# Patient Record
Sex: MALE | Race: WHITE | HISPANIC OR LATINO | URBAN - METROPOLITAN AREA
[De-identification: names, ages, dates, MRNs, and addresses within clinical notes are randomized per-mention and may not be internally consistent; named-entity substitution may affect disease eponyms.]

---

## 2018-03-22 ENCOUNTER — OFFICE VISIT (OUTPATIENT)
Dept: FAMILY MEDICINE CLINIC | Facility: CLINIC | Age: 48
End: 2018-03-22

## 2018-03-22 VITALS
TEMPERATURE: 97.1 F | SYSTOLIC BLOOD PRESSURE: 120 MMHG | HEART RATE: 94 BPM | WEIGHT: 180 LBS | DIASTOLIC BLOOD PRESSURE: 82 MMHG | RESPIRATION RATE: 16 BRPM

## 2018-03-22 DIAGNOSIS — N52.8 OTHER MALE ERECTILE DYSFUNCTION: ICD-10-CM

## 2018-03-22 DIAGNOSIS — Z13.6 SCREENING FOR ISCHEMIC HEART DISEASE: ICD-10-CM

## 2018-03-22 DIAGNOSIS — K21.00 GASTROESOPHAGEAL REFLUX DISEASE WITH ESOPHAGITIS: Primary | ICD-10-CM

## 2018-03-22 DIAGNOSIS — D18.00 CONGENITAL HEMANGIOMA: ICD-10-CM

## 2018-03-22 PROCEDURE — 99204 OFFICE O/P NEW MOD 45 MIN: CPT | Performed by: FAMILY MEDICINE

## 2018-03-22 RX ORDER — SILDENAFIL 100 MG/1
TABLET, FILM COATED ORAL
Qty: 6 TABLET | Refills: 11 | Status: SHIPPED | OUTPATIENT
Start: 2018-03-22 | End: 2019-03-22

## 2018-03-22 RX ORDER — OMEPRAZOLE 20 MG/1
20 CAPSULE, DELAYED RELEASE ORAL DAILY
COMMUNITY
End: 2018-03-22 | Stop reason: ALTCHOICE

## 2018-03-22 RX ORDER — PANTOPRAZOLE SODIUM 40 MG/1
40 TABLET, DELAYED RELEASE ORAL NIGHTLY
Qty: 90 TABLET | Refills: 1 | Status: SHIPPED | OUTPATIENT
Start: 2018-03-22 | End: 2018-04-26 | Stop reason: SDUPTHER

## 2018-03-22 NOTE — PROGRESS NOTES
Chief Complaint   Patient presents with   • Heartburn   • GI Problem       Subjective   This patient is here to establish.  His previous doctor went to [facility].  Today his chief complaint is worsening GERD symptoms, present for about 18 months.  He has been taking over-the-counter proton pump inhibitor and it has lost its effectiveness.  He does have some globus sensation in the neck.  He does have family history of a brother who had a have his esophagus dilated.  Currently the patient does not have swallowing difficulties.    He has had a 6 month episode of ED symptoms with intact libido.  He has noticed decreased morning erection ability.  I have reviewed and updated his medications, medical history and problem list during today's office visit.       Assessment/Plan     Problem List Items Addressed This Visit        Digestive    Gastroesophageal reflux disease with esophagitis - Primary    Relevant Medications    pantoprazole (PROTONIX) 40 MG EC tablet    Other Relevant Orders    Comprehensive metabolic panel    Lipid Panel With LDL/HDL Ratio    CBC and Differential    Helicobacter Pylori, IgA IgG IgM       Other    Other male erectile dysfunction    Relevant Medications    sildenafil (VIAGRA) 100 MG tablet    Congenital hemangioma, right face    Relevant Orders    Ambulatory Referral to Dermatology      Other Visit Diagnoses     Screening for ischemic heart disease        Relevant Orders    Comprehensive metabolic panel    Lipid Panel With LDL/HDL Ratio    CBC and Differential          Orders Placed This Encounter   Procedures   • Comprehensive metabolic panel   • Lipid Panel With LDL/HDL Ratio   • Helicobacter Pylori, IgA IgG IgM   • Ambulatory Referral to Dermatology     Referral Priority:   Routine     Referral Type:   Consultation     Referral Reason:   Specialty Services Required     Referred to Provider:   Michael Moore MD     Requested Specialty:   Dermatology     Number of Visits  Requested:   1   • CBC and Differential     Order Specific Question:   Manual Differential     Answer:   No       F/U in one month         Review of Systems   Gastrointestinal:        See HPI   Genitourinary:        See HPI   All other systems reviewed and are negative.    Objective   /82   Pulse 94   Temp 97.1 °F (36.2 °C) (Oral)   Resp 16   Wt 81.6 kg (180 lb)   There is no height or weight on file to calculate BMI.  Physical Exam   Constitutional: He is oriented to person, place, and time. Vital signs are normal. He appears well-developed. No distress.   HENT:   Head: Normocephalic and atraumatic.   Right Ear: Hearing and tympanic membrane normal.   Left Ear: Hearing and tympanic membrane normal.   Nose: Nose normal.   Mouth/Throat: Uvula is midline, oropharynx is clear and moist and mucous membranes are normal.   Eyes: Conjunctivae, EOM and lids are normal. Pupils are equal, round, and reactive to light.   Neck: Trachea normal and phonation normal. No JVD present. Carotid bruit is not present. No thyroid mass and no thyromegaly present.   Cardiovascular: Normal rate, regular rhythm and normal heart sounds.    Pulmonary/Chest: Effort normal and breath sounds normal.   Abdominal: Soft. Normal appearance and bowel sounds are normal. There is no hepatosplenomegaly. There is no tenderness.   Musculoskeletal: Normal range of motion.        Lumbar back: He exhibits no deformity.   Lymphadenopathy:     He has no cervical adenopathy.        Right: No supraclavicular adenopathy present.        Left: No supraclavicular adenopathy present.   Neurological: He is alert and oriented to person, place, and time. He has normal strength. No cranial nerve deficit.   Reflex Scores:       Patellar reflexes are 2+ on the right side and 2+ on the left side.  Skin: Skin is warm and dry. No rash noted.   Congenital hemangioma right face   Psychiatric: He has a normal mood and affect. His speech is normal and behavior is normal.  Judgment and thought content normal. Cognition and memory are normal. He is attentive.        Social History   Substance Use Topics   • Smoking status: Never Smoker   • Smokeless tobacco: Never Used   • Alcohol use No       Data Reviewed:

## 2018-03-26 LAB
ALBUMIN SERPL-MCNC: 4.8 G/DL (ref 3.5–5.2)
ALBUMIN/GLOB SERPL: 2 G/DL
ALP SERPL-CCNC: 95 U/L (ref 39–117)
ALT SERPL-CCNC: 32 U/L (ref 1–41)
AST SERPL-CCNC: 18 U/L (ref 1–40)
BASOPHILS # BLD AUTO: 0.03 10*3/MM3 (ref 0–0.2)
BASOPHILS NFR BLD AUTO: 0.5 % (ref 0–1.5)
BILIRUB SERPL-MCNC: 0.3 MG/DL (ref 0.1–1.2)
BUN SERPL-MCNC: 17 MG/DL (ref 6–20)
BUN/CREAT SERPL: 14.3 (ref 7–25)
CALCIUM SERPL-MCNC: 10.1 MG/DL (ref 8.6–10.5)
CHLORIDE SERPL-SCNC: 99 MMOL/L (ref 98–107)
CHOLEST SERPL-MCNC: 241 MG/DL (ref 0–200)
CO2 SERPL-SCNC: 25 MMOL/L (ref 22–29)
CREAT SERPL-MCNC: 1.19 MG/DL (ref 0.76–1.27)
EOSINOPHIL # BLD AUTO: 0.17 10*3/MM3 (ref 0–0.7)
EOSINOPHIL NFR BLD AUTO: 2.9 % (ref 0.3–6.2)
ERYTHROCYTE [DISTWIDTH] IN BLOOD BY AUTOMATED COUNT: 13.6 % (ref 11.5–14.5)
GFR SERPLBLD CREATININE-BSD FMLA CKD-EPI: 65 ML/MIN/1.73
GFR SERPLBLD CREATININE-BSD FMLA CKD-EPI: 79 ML/MIN/1.73
GLOBULIN SER CALC-MCNC: 2.4 GM/DL
GLUCOSE SERPL-MCNC: 126 MG/DL (ref 65–99)
H PYLORI IGA SER-ACNC: <9 UNITS (ref 0–8.9)
H PYLORI IGG SER IA-ACNC: 0.28 INDEX VALUE (ref 0–0.79)
H PYLORI IGM SER-ACNC: <9 UNITS (ref 0–8.9)
HCT VFR BLD AUTO: 43.3 % (ref 40.4–52.2)
HDLC SERPL-MCNC: 56 MG/DL (ref 40–60)
HGB BLD-MCNC: 14.1 G/DL (ref 13.7–17.6)
IMM GRANULOCYTES # BLD: 0.02 10*3/MM3 (ref 0–0.03)
IMM GRANULOCYTES NFR BLD: 0.3 % (ref 0–0.5)
LDLC SERPL CALC-MCNC: 159 MG/DL (ref 0–100)
LDLC/HDLC SERPL: 2.85 {RATIO}
LYMPHOCYTES # BLD AUTO: 1.39 10*3/MM3 (ref 0.9–4.8)
LYMPHOCYTES NFR BLD AUTO: 23.3 % (ref 19.6–45.3)
MCH RBC QN AUTO: 29.4 PG (ref 27–32.7)
MCHC RBC AUTO-ENTMCNC: 32.6 G/DL (ref 32.6–36.4)
MCV RBC AUTO: 90.4 FL (ref 79.8–96.2)
MONOCYTES # BLD AUTO: 0.32 10*3/MM3 (ref 0.2–1.2)
MONOCYTES NFR BLD AUTO: 5.4 % (ref 5–12)
NEUTROPHILS # BLD AUTO: 4.03 10*3/MM3 (ref 1.9–8.1)
NEUTROPHILS NFR BLD AUTO: 67.6 % (ref 42.7–76)
PLATELET # BLD AUTO: 156 10*3/MM3 (ref 140–500)
POTASSIUM SERPL-SCNC: 4.3 MMOL/L (ref 3.5–5.2)
PROT SERPL-MCNC: 7.2 G/DL (ref 6–8.5)
RBC # BLD AUTO: 4.79 10*6/MM3 (ref 4.6–6)
SODIUM SERPL-SCNC: 140 MMOL/L (ref 136–145)
TRIGL SERPL-MCNC: 128 MG/DL (ref 0–150)
VLDLC SERPL CALC-MCNC: 25.6 MG/DL (ref 5–40)
WBC # BLD AUTO: 5.96 10*3/MM3 (ref 4.5–10.7)

## 2018-04-26 ENCOUNTER — OFFICE VISIT (OUTPATIENT)
Dept: FAMILY MEDICINE CLINIC | Facility: CLINIC | Age: 48
End: 2018-04-26

## 2018-04-26 VITALS
WEIGHT: 186 LBS | RESPIRATION RATE: 16 BRPM | SYSTOLIC BLOOD PRESSURE: 130 MMHG | DIASTOLIC BLOOD PRESSURE: 85 MMHG | TEMPERATURE: 97.3 F | HEART RATE: 81 BPM

## 2018-04-26 DIAGNOSIS — R73.01 ABNORMAL FASTING GLUCOSE: ICD-10-CM

## 2018-04-26 DIAGNOSIS — Z23 IMMUNIZATION DUE: ICD-10-CM

## 2018-04-26 DIAGNOSIS — E78.00 PURE HYPERCHOLESTEROLEMIA: ICD-10-CM

## 2018-04-26 DIAGNOSIS — K21.00 GASTROESOPHAGEAL REFLUX DISEASE WITH ESOPHAGITIS: Primary | ICD-10-CM

## 2018-04-26 PROCEDURE — 90471 IMMUNIZATION ADMIN: CPT | Performed by: FAMILY MEDICINE

## 2018-04-26 PROCEDURE — 90715 TDAP VACCINE 7 YRS/> IM: CPT | Performed by: FAMILY MEDICINE

## 2018-04-26 PROCEDURE — 99214 OFFICE O/P EST MOD 30 MIN: CPT | Performed by: FAMILY MEDICINE

## 2018-04-26 RX ORDER — PANTOPRAZOLE SODIUM 40 MG/1
40 TABLET, DELAYED RELEASE ORAL NIGHTLY
Qty: 90 TABLET | Refills: 1 | Status: SHIPPED | OUTPATIENT
Start: 2018-04-26 | End: 2021-09-02

## 2018-04-26 NOTE — ASSESSMENT & PLAN NOTE
Lipid abnormalities are newly identified.  Nutritional counseling was provided.  Lipids will be reassessed in 3 months.

## 2018-04-26 NOTE — PATIENT INSTRUCTIONS
Pantoprazole tablets  What is this medicine?  PANTOPRAZOLE (pan TOE pra zole) prevents the production of acid in the stomach. It is used to treat gastroesophageal reflux disease (GERD), inflammation of the esophagus, and Zollinger-Reece syndrome.  This medicine may be used for other purposes; ask your health care provider or pharmacist if you have questions.  COMMON BRAND NAME(S): Protonix  What should I tell my health care provider before I take this medicine?  They need to know if you have any of these conditions:  -liver disease  -low levels of magnesium in the blood  -lupus  -an unusual or allergic reaction to omeprazole, lansoprazole, pantoprazole, rabeprazole, other medicines, foods, dyes, or preservatives  -pregnant or trying to get pregnant  -breast-feeding  How should I use this medicine?  Take this medicine by mouth. Swallow the tablets whole with a drink of water. Follow the directions on the prescription label. Do not crush, break, or chew. Take your medicine at regular intervals. Do not take your medicine more often than directed.  Talk to your pediatrician regarding the use of this medicine in children. While this drug may be prescribed for children as young as 5 years for selected conditions, precautions do apply.  Overdosage: If you think you have taken too much of this medicine contact a poison control center or emergency room at once.  NOTE: This medicine is only for you. Do not share this medicine with others.  What if I miss a dose?  If you miss a dose, take it as soon as you can. If it is almost time for your next dose, take only that dose. Do not take double or extra doses.  What may interact with this medicine?  Do not take this medicine with any of the following medications:  -atazanavir  -nelfinavir  This medicine may also interact with the following medications:  -ampicillin  -delavirdine  -erlotinib  -iron salts  -medicines for fungal infections like ketoconazole, itraconazole and  voriconazole  -methotrexate  -mycophenolate mofetil  -warfarin  This list may not describe all possible interactions. Give your health care provider a list of all the medicines, herbs, non-prescription drugs, or dietary supplements you use. Also tell them if you smoke, drink alcohol, or use illegal drugs. Some items may interact with your medicine.  What should I watch for while using this medicine?  It can take several days before your stomach pain gets better. Check with your doctor or health care professional if your condition does not start to get better, or if it gets worse.  You may need blood work done while you are taking this medicine.  What side effects may I notice from receiving this medicine?  Side effects that you should report to your doctor or health care professional as soon as possible:  -allergic reactions like skin rash, itching or hives, swelling of the face, lips, or tongue  -bone, muscle or joint pain  -breathing problems  -chest pain or chest tightness  -dark yellow or brown urine  -dizziness  -fast, irregular heartbeat  -feeling faint or lightheaded  -fever or sore throat  -muscle spasm  -palpitations  -rash on cheeks or arms that gets worse in the sun  -redness, blistering, peeling or loosening of the skin, including inside the mouth  -seizures  -tremors  -unusual bleeding or bruising  -unusually weak or tired  -yellowing of the eyes or skin  Side effects that usually do not require medical attention (report to your doctor or health care professional if they continue or are bothersome):  -constipation  -diarrhea  -dry mouth  -headache  -nausea  This list may not describe all possible side effects. Call your doctor for medical advice about side effects. You may report side effects to FDA at 4-602-FDA-6959.  Where should I keep my medicine?  Keep out of the reach of children.  Store at room temperature between 15 and 30 degrees C (59 and 86 degrees F). Protect from light and moisture. Throw  "away any unused medicine after the expiration date.  NOTE: This sheet is a summary. It may not cover all possible information. If you have questions about this medicine, talk to your doctor, pharmacist, or health care provider.  © 2018 Elsevier/Gold Standard (2017-01-19 12:20:19)    Fat and Cholesterol Restricted Diet  Getting too much fat and cholesterol in your diet may cause health problems. Following this diet helps keep your fat and cholesterol at normal levels. This can keep you from getting sick.  WHAT TYPES OF FAT SHOULD I CHOOSE?  · Choose monosaturated and polyunsaturated fats. These are found in foods such as olive oil, canola oil, flaxseeds, walnuts, almonds, and seeds.  · Eat more omega-3 fats. Good choices include salmon, mackerel, sardines, tuna, flaxseed oil, and ground flaxseeds.  · Limit saturated fats. These are in animal products such as meats, butter, and cream. They can also be in plant products such as palm oil, palm kernel oil, and coconut oil.  ·   ·   · Avoid foods with partially hydrogenated oils in them. These contain trans fats. Examples of foods that have trans fats are stick margarine, some tub margarines, cookies, crackers, and other baked goods.  WHAT GENERAL GUIDELINES DO I NEED TO FOLLOW?    · Check food labels. Look for the words \"trans fat\" and \"saturated fat.\"  · When preparing a meal:  ¨ Fill half of your plate with vegetables and green salads.  ¨ Fill one fourth of your plate with whole grains. Look for the word \"whole\" as the first word in the ingredient list.  ¨ Fill one fourth of your plate with lean protein foods.  · Limit fruit to two servings a day. Choose fruit instead of juice.  · Eat more foods with soluble fiber. Examples of foods with this type of fiber are apples, broccoli, carrots, beans, peas, and barley. Try to get 20-30 g (grams) of fiber per day.  · Eat more home-cooked foods. Eat less at restaurants and buffets.  · Limit or avoid alcohol.  · Limit foods high " in starch and sugar.  · Limit fried foods.  · Cook foods without frying them. Baking, boiling, grilling, and broiling are all great options.  · Lose weight if you are overweight. Losing even a small amount of weight can help your overall health. It can also help prevent diseases such as diabetes and heart disease.  WHAT FOODS CAN I EAT?  Grains  Whole grains, such as whole wheat or whole grain breads, crackers, cereals, and pasta. Unsweetened oatmeal, bulgur, barley, quinoa, or brown rice. Corn or whole wheat flour tortillas.  Vegetables  Fresh or frozen vegetables (raw, steamed, roasted, or grilled). Green salads.  Fruits  All fresh, canned (in natural juice), or frozen fruits.  Meat and Other Protein Products  Ground beef (85% or leaner), grass-fed beef, or beef trimmed of fat. Skinless chicken or turkey. Ground chicken or turkey. Pork trimmed of fat. All fish and seafood. Eggs. Dried beans, peas, or lentils. Unsalted nuts or seeds. Unsalted canned or dry beans.  Dairy  Low-fat dairy products, such as skim or 1% milk, 2% or reduced-fat cheeses, low-fat ricotta or cottage cheese, or plain low-fat yogurt.  Fats and Oils  Tub margarines without trans fats. Light or reduced-fat mayonnaise and salad dressings. Avocado. Olive, canola, sesame, or safflower oils. Natural peanut or almond butter (choose ones without added sugar and oil).  The items listed above may not be a complete list of recommended foods or beverages. Contact your dietitian for more options.  WHAT FOODS ARE NOT RECOMMENDED?  Grains  White bread. White pasta. White rice. Cornbread. Bagels, pastries, and croissants. Crackers that contain trans fat.  Vegetables  White potatoes. Corn. Creamed or fried vegetables. Vegetables in a cheese sauce.  Fruits  Dried fruits. Canned fruit in light or heavy syrup. Fruit juice.  Meat and Other Protein Products  Fatty cuts of meat. Ribs, chicken wings, kaur, sausage, bologna, salami, chitterlings, fatback, hot dogs,  bratwurst, and packaged luncheon meats. Liver and organ meats.  Dairy  Whole or 2% milk, cream, half-and-half, and cream cheese. Whole milk cheeses. Whole-fat or sweetened yogurt. Full-fat cheeses. Nondairy creamers and whipped toppings. Processed cheese, cheese spreads, or cheese curds.  Sweets and Desserts  Corn syrup, sugars, honey, and molasses. Candy. Jam and jelly. Syrup. Sweetened cereals. Cookies, pies, cakes, donuts, muffins, and ice cream.  Fats and Oils  Butter, stick margarine, lard, shortening, ghee, or kaur fat. Coconut, palm kernel, or palm oils.  Beverages  Alcohol. Sweetened drinks (such as sodas, lemonade, and fruit drinks or punches).  The items listed above may not be a complete list of foods and beverages to avoid. Contact your dietitian for more information.  Document Released: 06/18/2013 Document Revised: 08/21/2015 Document Reviewed: 03/19/2015  ExitCare® Patient Information ©2015 [employer]. This information is not intended to replace advice given to you by your health care provider. Make sure you discuss any questions you have with your health care provider.      Carbohydrate Counting for Diabetes Mellitus, Adult  Carbohydrate counting is a method for keeping track of how many carbohydrates you eat. Eating carbohydrates naturally increases the amount of sugar (glucose) in the blood. Counting how many carbohydrates you eat helps keep your blood glucose within normal limits, which helps you manage your diabetes (diabetes mellitus).  It is important to know how many carbohydrates you can safely have in each meal. This is different for every person. A diet and nutrition specialist (registered dietitian) can help you make a meal plan and calculate how many carbohydrates you should have at each meal and snack.  Carbohydrates are found in the following foods:  · Grains, such as breads and cereals.  · Dried beans and soy products.  · Starchy vegetables, such as potatoes, peas, and  "corn.  · Fruit and fruit juices.  · Milk and yogurt.  · Sweets and snack foods, such as cake, cookies, candy, chips, and soft drinks.  How do I count carbohydrates?  There are two ways to count carbohydrates in food. You can use either of the methods or a combination of both.  Reading \"Nutrition Facts\" on packaged food   The \"Nutrition Facts\" list is included on the labels of almost all packaged foods and beverages in the [place] It includes:  · The serving size.  · Information about nutrients in each serving, including the grams (g) of carbohydrate per serving.  To use the “Nutrition Facts\":  · Decide how many servings you will have.  · Multiply the number of servings by the number of carbohydrates per serving.  · The resulting number is the total amount of carbohydrates that you will be having.  Learning standard serving sizes of other foods   When you eat foods containing carbohydrates that are not packaged or do not include \"Nutrition Facts\" on the label, you need to measure the servings in order to count the amount of carbohydrates:  · Measure the foods that you will eat with a food scale or measuring cup, if needed.  · Decide how many standard-size servings you will eat.  · Multiply the number of servings by 15. Most carbohydrate-rich foods have about 15 g of carbohydrates per serving.  ¨ For example, if you eat 8 oz (170 g) of strawberries, you will have eaten 2 servings and 30 g of carbohydrates (2 servings x 15 g = 30 g).  · For foods that have more than one food mixed, such as soups and casseroles, you must count the carbohydrates in each food that is included.  The following list contains standard serving sizes of common carbohydrate-rich foods. Each of these servings has about 15 g of carbohydrates:  · ½ hamburger bun or ½ English muffin.  · ½ oz (15 mL) syrup.  · ½ oz (14 g) jelly.  · 1 slice of bread.  · 1 six-inch tortilla.  · 3 oz (85 g) cooked rice or pasta.  · 4 oz (113 g) cooked dried beans.  · 4 oz " (113 g) starchy vegetable, such as peas, corn, or potatoes.  · 4 oz (113 g) hot cereal.  · 4 oz (113 g) mashed potatoes or ¼ of a large baked potato.  · 4 oz (113 g) canned or frozen fruit.  · 4 oz (120 mL) fruit juice.  · 4-6 crackers.  · 6 chicken nuggets.  · 6 oz (170 g) unsweetened dry cereal.  · 6 oz (170 g) plain fat-free yogurt or yogurt sweetened with artificial sweeteners.  · 8 oz (240 mL) milk.  · 8 oz (170 g) fresh fruit or one small piece of fruit.  · 24 oz (680 g) popped popcorn.  Example of carbohydrate counting  Sample meal   · 3 oz (85 g) chicken breast.  · 6 oz (170 g) brown rice.  · 4 oz (113 g) corn.  · 8 oz (240 mL) milk.  · 8 oz (170 g) strawberries with sugar-free whipped topping.  Carbohydrate calculation   1. Identify the foods that contain carbohydrates:  ¨ Rice.  ¨ Corn.  ¨ Milk.  ¨ Strawberries.  2. Calculate how many servings you have of each food:  ¨ 2 servings rice.  ¨ 1 serving corn.  ¨ 1 serving milk.  ¨ 1 serving strawberries.  3. Multiply each number of servings by 15 g:  ¨ 2 servings rice x 15 g = 30 g.  ¨ 1 serving corn x 15 g = 15 g.  ¨ 1 serving milk x 15 g = 15 g.  ¨ 1 serving strawberries x 15 g = 15 g.  4. Add together all of the amounts to find the total grams of carbohydrates eaten:  ¨ 30 g + 15 g + 15 g + 15 g = 75 g of carbohydrates total.  This information is not intended to replace advice given to you by your health care provider. Make sure you discuss any questions you have with your health care provider.  Document Released: 12/18/2006 Document Revised: 07/07/2017 Document Reviewed: 05/31/2017  Elsevier Interactive Patient Education © 2017 [employer] Inc.

## 2018-04-26 NOTE — PROGRESS NOTES
Chief Complaint   Patient presents with   • Results     review labs       Subjective     Memo Chang presents to the office today to refill his medications and review recent labs. No medication side effects are reported.  GERD Symptoms are well controlled.  Labs have been reviewed and show elevation of fasting glucose to 126.  He does have pure hypercholesterolemia.  Please see the statin decision made below.    I have reviewed and updated his medications, medical history and problem list during today's office visit.  I have used a decision aid to share decision making with the patient about interventions to reduce the risk of coronary events. We estimated the patient's 10-year of atherosclerotic events at 3% and discussed how this risk could be reduced with the use of statins to 2%.  After considering the patient's unique circumstances and the pros and cons of the alternatives, we have decided to...continue with diet and exercise.     Patient Care Team:  Michael Izquierdo MD as PCP - General (Family Medicine)    Social History   Substance Use Topics   • Smoking status: Never Smoker   • Smokeless tobacco: Never Used   • Alcohol use No       Review of Systems   Constitutional: Negative for fatigue.   Cardiovascular: Negative for chest pain.       Objective     /85   Pulse 81   Temp 97.3 °F (36.3 °C) (Oral)   Resp 16   Wt 84.4 kg (186 lb)     There is no height or weight on file to calculate BMI.    Physical Exam   Constitutional: He is oriented to person, place, and time. He appears well-developed. No distress.   Eyes: Conjunctivae and lids are normal.   Neck: Carotid bruit is not present.   Cardiovascular: Normal rate, regular rhythm and normal heart sounds.    Pulmonary/Chest: Effort normal and breath sounds normal.   Neurological: He is alert and oriented to person, place, and time.   Skin: Skin is warm and dry.   Psychiatric: He has a normal mood and affect. His behavior is normal.   Vitals  reviewed.      Data Reviewed:         Lab Results   Component Value Date     (H) 03/23/2018    BUN 17 03/23/2018    CREATININE 1.19 03/23/2018    EGFRIFNONA 65 03/23/2018    EGFRIFAFRI 79 03/23/2018     03/23/2018    K 4.3 03/23/2018    CL 99 03/23/2018    CALCIUM 10.1 03/23/2018    PROTENTOTREF 7.2 03/23/2018    ALBUMIN 4.80 03/23/2018    LABGLOBREF 2.4 03/23/2018    BILITOT 0.3 03/23/2018    ALKPHOS 95 03/23/2018    AST 18 03/23/2018    ALT 32 03/23/2018     CBC w/ diff:   Lab Results   Component Value Date    WBC 5.96 03/23/2018    RBC 4.79 03/23/2018    HGB 14.1 03/23/2018    HCT 43.3 03/23/2018    MCV 90.4 03/23/2018    MCH 29.4 03/23/2018    MCHC 32.6 03/23/2018    RDW 13.6 03/23/2018     03/23/2018    NEUTRORELPCT 67.6 03/23/2018    LYMPHORELPCT 23.3 03/23/2018    MONORELPCT 5.4 03/23/2018    EOSRELPCT 2.9 03/23/2018    BASORELPCT 0.5 03/23/2018     LIPID PANEL:  Lab Results   Component Value Date    CHLPL 241 (H) 03/23/2018    TRIG 128 03/23/2018    HDL 56 03/23/2018    VLDL 25.6 03/23/2018     (H) 03/23/2018    LDLHDL 2.85 03/23/2018     TSH:No results found for: TSH    Assessment/Plan     Problem List Items Addressed This Visit        Cardiovascular and Mediastinum    Pure hypercholesterolemia     Lipid abnormalities are newly identified.  Nutritional counseling was provided.  Lipids will be reassessed in 3 months.         Relevant Orders    Comprehensive metabolic panel    Lipid Panel With LDL/HDL Ratio    CBC and Differential       Digestive    Gastroesophageal reflux disease with esophagitis - Primary     Information on pantoprazole shared with patient         Relevant Medications    pantoprazole (PROTONIX) 40 MG EC tablet       Endocrine    Abnormal fasting glucose     Low carbohydrate diet shared with patient         Relevant Orders    Hemoglobin A1c    MicroAlbumin, Urine, Random - Urine, Clean Catch      Other Visit Diagnoses     Immunization due        Relevant Orders     Tdap Vaccine Greater Than or Equal To 8yo IM          Orders Placed This Encounter   Procedures   • Tdap Vaccine Greater Than or Equal To 8yo IM   • Comprehensive metabolic panel     Standing Status:   Future     Standing Expiration Date:   10/23/2018   • Lipid Panel With LDL/HDL Ratio     Standing Status:   Future     Standing Expiration Date:   10/23/2018   • Hemoglobin A1c     Standing Status:   Future     Standing Expiration Date:   10/23/2018   • MicroAlbumin, Urine, Random - Urine, Clean Catch     Standing Status:   Future     Standing Expiration Date:   10/23/2018   • CBC and Differential     Standing Status:   Future     Standing Expiration Date:   10/23/2018     Order Specific Question:   Manual Differential     Answer:   No         Current Outpatient Prescriptions:   •  pantoprazole (PROTONIX) 40 MG EC tablet, Take 1 tablet by mouth Every Night for 180 days., Disp: 90 tablet, Rfl: 1  •  sildenafil (VIAGRA) 100 MG tablet, Use 0.5-1 tablet po 60 minutes prior to sexual activity prn, Disp: 6 tablet, Rfl: 11    Return in about 3 months (around 7/26/2018) for Recheck.

## 2018-06-25 ENCOUNTER — RESULTS ENCOUNTER (OUTPATIENT)
Dept: FAMILY MEDICINE CLINIC | Facility: CLINIC | Age: 48
End: 2018-06-25

## 2018-06-25 DIAGNOSIS — E78.00 PURE HYPERCHOLESTEROLEMIA: ICD-10-CM

## 2018-06-25 DIAGNOSIS — R73.01 ABNORMAL FASTING GLUCOSE: ICD-10-CM

## 2021-09-02 ENCOUNTER — OFFICE VISIT (OUTPATIENT)
Dept: FAMILY MEDICINE CLINIC | Facility: CLINIC | Age: 51
End: 2021-09-02

## 2021-09-02 VITALS
BODY MASS INDEX: 23.57 KG/M2 | RESPIRATION RATE: 16 BRPM | OXYGEN SATURATION: 100 % | DIASTOLIC BLOOD PRESSURE: 87 MMHG | HEIGHT: 72 IN | TEMPERATURE: 98.4 F | HEART RATE: 67 BPM | SYSTOLIC BLOOD PRESSURE: 128 MMHG | WEIGHT: 174 LBS

## 2021-09-02 DIAGNOSIS — M79.89 OTHER SPECIFIED SOFT TISSUE DISORDERS: ICD-10-CM

## 2021-09-02 DIAGNOSIS — L72.9 BENIGN CYST OF SKIN: Primary | ICD-10-CM

## 2021-09-02 PROCEDURE — 99203 OFFICE O/P NEW LOW 30 MIN: CPT | Performed by: FAMILY MEDICINE

## 2021-09-02 RX ORDER — DIPHENHYDRAMINE HCL 12.5MG/5ML
LIQUID (ML) ORAL
COMMUNITY
End: 2021-09-02

## 2021-09-02 NOTE — PROGRESS NOTES
"Chief Complaint  Cyst    Subjective          Memo presents to [facility] to discuss cyst on right forearm and also for some years are his right forearm.  The cyst has been present for about a year and is nonprogressive.  The fullness on the inside of his forearm on the right side has recently been noticed over the past 2 to 3 months.  No noticeable progression since he has identified this fullness.  He has known hemangioma of the right face.  No symptoms related to either the cyst or the forearm fullness are reported.          Objective   Vital Signs:   Vitals:    09/02/21 0839   BP: 128/87   Pulse: 67   Resp: 16   Temp: 98.4 °F (36.9 °C)   TempSrc: Skin   SpO2: 100%   Weight: 78.9 kg (174 lb)   Height: 182.9 cm (72\")        Physical Exam  Vitals reviewed.   Constitutional:       General: He is not in acute distress.  Skin:            Comments: See photo below   Neurological:      Mental Status: He is alert.              Result Review :                Assessment and Plan    Diagnoses and all orders for this visit:    1. Benign cyst of skin (Primary)  Assessment & Plan:  Ongoing problem most likely benign in nature.    Orders:  -     Ambulatory Referral to General Surgery    2. Other specified soft tissue disorders  Assessment & Plan:  Soft tissue swelling of the right volar aspect of the forearm is new.  Referral to general surgeon for further evaluation.    Orders:  -     Ambulatory Referral to General Surgery      Follow Up   Return if symptoms worsen or fail to improve.  Patient was given instructions and counseling regarding his condition or for health maintenance advice. Please see specific information pulled into the AVS if appropriate.   "

## 2021-09-02 NOTE — ASSESSMENT & PLAN NOTE
Soft tissue swelling of the right volar aspect of the forearm is new.  Referral to general surgeon for further evaluation.

## 2024-06-06 ENCOUNTER — OFFICE VISIT (OUTPATIENT)
Dept: FAMILY MEDICINE CLINIC | Facility: CLINIC | Age: 54
End: 2024-06-06
Payer: COMMERCIAL

## 2024-06-06 VITALS
WEIGHT: 173 LBS | BODY MASS INDEX: 24.22 KG/M2 | HEIGHT: 71 IN | HEART RATE: 60 BPM | DIASTOLIC BLOOD PRESSURE: 84 MMHG | SYSTOLIC BLOOD PRESSURE: 124 MMHG | OXYGEN SATURATION: 100 %

## 2024-06-06 DIAGNOSIS — Z13.6 SCREENING FOR ISCHEMIC HEART DISEASE: ICD-10-CM

## 2024-06-06 DIAGNOSIS — R29.818 AURA: Primary | ICD-10-CM

## 2024-06-06 DIAGNOSIS — E78.00 PURE HYPERCHOLESTEROLEMIA: ICD-10-CM

## 2024-06-06 PROCEDURE — 99214 OFFICE O/P EST MOD 30 MIN: CPT | Performed by: FAMILY MEDICINE

## 2024-06-06 RX ORDER — UBIDECARENONE 75 MG
50 CAPSULE ORAL DAILY
COMMUNITY

## 2024-06-06 RX ORDER — ASPIRIN 81 MG/1
81 TABLET ORAL NIGHTLY
Start: 2024-06-06 | End: 2024-07-06

## 2024-06-06 NOTE — PROGRESS NOTES
"Chief Complaint  Ocular Aura (No migraine, just the aura, first was in 2015 (mild), then 2 years ago he started getting them every month)    Subjective        HPI   History of Present Illness  The patient is a 53-year-old male who presents for evaluation of ocular auras.    The patient first experienced bilateral ocular auras in 2015, initially mistaken for floaters, which progressively worsened, resembling a staring sensation. These episodes, irrespective of which eye was closed, did not recur for a year later, but recurred annually, occurring almost monthly. 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 Despite the patient's lack of associated headaches, he was diagnosed with ocular aura and recommended further evaluation. The patient alternated between B12 and magnesium supplements, which significantly improved his symptoms. However, he continues to experience monthly episodes, each lasting approximately 30 minutes. These episodes begin in the middle of his field of vision, impairing his vision, before gradually becoming akin to a football. The patient, who works on a computer extensively.  He expresses concern about the potential association of these episodes with mini strokes and queries about potential treatments for transient ischemic attacks (TIAs). He also reports no changes in shapes or smells during these episodes.     He denies any family history of strokes. His father had high cholesterol.          Vital Signs:   Vitals:    06/06/24 1435   BP: 124/84   Pulse: 60   SpO2: 100%   Weight: 78.5 kg (173 lb)   Height: 180.3 cm (71\")            6/6/2024     3:00 PM   PHQ-2/PHQ-9 Depression Screening   Little Interest or Pleasure in Doing Things 0-->not at all   Feeling Down, Depressed or Hopeless 0-->not at all   PHQ-9: Brief Depression Severity Measure Score 0       BMI is within normal parameters. No " other follow-up for BMI required.        Physical Exam  Vitals reviewed.   Constitutional:       General: He is not in acute distress.  Cardiovascular:      Rate and Rhythm: Normal rate and regular rhythm.      Heart sounds: Normal heart sounds.   Pulmonary:      Effort: Pulmonary effort is normal.      Breath sounds: Normal breath sounds.   Neurological:      General: No focal deficit present.      Mental Status: He is alert.   Psychiatric:         Attention and Perception: Attention normal.         Mood and Affect: Mood normal.         Behavior: Behavior normal.       Physical Exam                 Results                  Assessment and Plan     Orders Placed This Encounter   Procedures    Comprehensive Metabolic Panel    CK    Lipid Panel With / Chol / HDL Ratio    CBC & Differential     New Medications Ordered This Visit   Medications    aspirin 81 MG EC tablet     Sig: Take 1 tablet by mouth Every Night for 30 days.     Assessment & Plan  1. 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  The patient has a documented history of elevated total cholesterol levels. The proposed plan is to conduct repeat laboratory tests. The patient will commence a regimen of 81 mg aspirin at bedtime and a trial of Nurtec 75 mg at the onset of aura, with a close documentation of response to the new medication. The patient has been provided with a low-cholesterol, low-fat diet with information on cholesterol-rich foods and food.    Follow-up  The patient is scheduled for a follow-up visit in the office in 3 months.         Patient was given instructions and counseling regarding his condition or for health maintenance advice. Please see specific information pulled into the AVS if appropriate.     Patient or patient representative verbalized consent for the use of Ambient Listening during the visit with  Michael Izquierdo MD for chart documentation. 6/6/2024  15:33 EDT

## 2024-06-08 LAB
ALBUMIN SERPL-MCNC: 4.5 G/DL (ref 3.5–5.2)
ALBUMIN/GLOB SERPL: 1.9 G/DL
ALP SERPL-CCNC: 99 U/L (ref 39–117)
ALT SERPL-CCNC: 18 U/L (ref 1–41)
AST SERPL-CCNC: 15 U/L (ref 1–40)
BASOPHILS # BLD AUTO: 0.03 10*3/MM3 (ref 0–0.2)
BASOPHILS NFR BLD AUTO: 0.6 % (ref 0–1.5)
BILIRUB SERPL-MCNC: 0.5 MG/DL (ref 0–1.2)
BUN SERPL-MCNC: 13 MG/DL (ref 6–20)
BUN/CREAT SERPL: 12.4 (ref 7–25)
CALCIUM SERPL-MCNC: 9.4 MG/DL (ref 8.6–10.5)
CHLORIDE SERPL-SCNC: 105 MMOL/L (ref 98–107)
CHOLEST SERPL-MCNC: 212 MG/DL (ref 0–200)
CHOLEST/HDLC SERPL: 3.85 {RATIO}
CK SERPL-CCNC: 69 U/L (ref 20–200)
CO2 SERPL-SCNC: 27.2 MMOL/L (ref 22–29)
CREAT SERPL-MCNC: 1.05 MG/DL (ref 0.76–1.27)
EGFRCR SERPLBLD CKD-EPI 2021: 84.4 ML/MIN/1.73
EOSINOPHIL # BLD AUTO: 0.07 10*3/MM3 (ref 0–0.4)
EOSINOPHIL NFR BLD AUTO: 1.3 % (ref 0.3–6.2)
ERYTHROCYTE [DISTWIDTH] IN BLOOD BY AUTOMATED COUNT: 13.6 % (ref 12.3–15.4)
GLOBULIN SER CALC-MCNC: 2.4 GM/DL
GLUCOSE SERPL-MCNC: 99 MG/DL (ref 65–99)
HCT VFR BLD AUTO: 41.3 % (ref 37.5–51)
HDLC SERPL-MCNC: 55 MG/DL (ref 40–60)
HGB BLD-MCNC: 13.8 G/DL (ref 13–17.7)
IMM GRANULOCYTES # BLD AUTO: 0.01 10*3/MM3 (ref 0–0.05)
IMM GRANULOCYTES NFR BLD AUTO: 0.2 % (ref 0–0.5)
LDLC SERPL CALC-MCNC: 143 MG/DL (ref 0–100)
LYMPHOCYTES # BLD AUTO: 1.06 10*3/MM3 (ref 0.7–3.1)
LYMPHOCYTES NFR BLD AUTO: 19.8 % (ref 19.6–45.3)
MCH RBC QN AUTO: 29.1 PG (ref 26.6–33)
MCHC RBC AUTO-ENTMCNC: 33.4 G/DL (ref 31.5–35.7)
MCV RBC AUTO: 87.1 FL (ref 79–97)
MONOCYTES # BLD AUTO: 0.37 10*3/MM3 (ref 0.1–0.9)
MONOCYTES NFR BLD AUTO: 6.9 % (ref 5–12)
NEUTROPHILS # BLD AUTO: 3.81 10*3/MM3 (ref 1.7–7)
NEUTROPHILS NFR BLD AUTO: 71.2 % (ref 42.7–76)
NRBC BLD AUTO-RTO: 0 /100 WBC (ref 0–0.2)
PLATELET # BLD AUTO: 146 10*3/MM3 (ref 140–450)
POTASSIUM SERPL-SCNC: 4.6 MMOL/L (ref 3.5–5.2)
PROT SERPL-MCNC: 6.9 G/DL (ref 6–8.5)
RBC # BLD AUTO: 4.74 10*6/MM3 (ref 4.14–5.8)
SODIUM SERPL-SCNC: 142 MMOL/L (ref 136–145)
TRIGL SERPL-MCNC: 76 MG/DL (ref 0–150)
VLDLC SERPL CALC-MCNC: 14 MG/DL (ref 5–40)
WBC # BLD AUTO: 5.35 10*3/MM3 (ref 3.4–10.8)

## 2024-06-09 NOTE — PROGRESS NOTES
Please give the patient the following message: (Send him a low-cholesterol diet)  Memo, your labs show elevated cholesterol 212 with a elevated LDL cholesterol (bad cholesterol) level of 143.  Please follow the low-cholesterol diet and we will recheck these labs prior to our office visit in early September.  Please get the labs a few days before that visit.  Dr. Izquierdo

## 2024-11-13 ENCOUNTER — OFFICE VISIT (OUTPATIENT)
Dept: FAMILY MEDICINE CLINIC | Facility: CLINIC | Age: 54
End: 2024-11-13
Payer: COMMERCIAL

## 2024-11-13 VITALS
HEART RATE: 61 BPM | DIASTOLIC BLOOD PRESSURE: 74 MMHG | WEIGHT: 171 LBS | HEIGHT: 71 IN | SYSTOLIC BLOOD PRESSURE: 126 MMHG | OXYGEN SATURATION: 98 % | BODY MASS INDEX: 23.94 KG/M2

## 2024-11-13 DIAGNOSIS — E78.00 PURE HYPERCHOLESTEROLEMIA: Primary | ICD-10-CM

## 2024-11-13 DIAGNOSIS — Z12.5 SCREENING FOR PROSTATE CANCER: ICD-10-CM

## 2024-11-13 DIAGNOSIS — Z13.6 SCREENING FOR ISCHEMIC HEART DISEASE: ICD-10-CM

## 2024-11-13 PROCEDURE — 99213 OFFICE O/P EST LOW 20 MIN: CPT | Performed by: FAMILY MEDICINE

## 2024-11-13 RX ORDER — ASPIRIN 81 MG/1
81 TABLET ORAL DAILY
COMMUNITY

## 2024-11-13 NOTE — ASSESSMENT & PLAN NOTE
Continue with heart healthy diet.  Further treatment options based on coronary calcium score test  Orders:    CT Cardiac Calcium Score Without Dye; Future

## 2024-11-13 NOTE — PROGRESS NOTES
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    He reports that he was unable to schedule a timely appointment following his last blood test. He has noticed an improvement in his cholesterol levels compared to the previous test. He reports no leg swelling. He also mentions experiencing occasional urination at night and a weak urine stream.    He describes experiencing unusual pains in his torso, primarily under his left rib, but occasionally on the right side as well. Over the past week, he has had a sharp pain in his left shoulder, which he compares to a previous sciatic nerve issue that caused pain in his hips. He believes the shoulder pain is nerve-related. Additionally, the rib pain has significantly decreased his appetite, leading him to eat smaller portions, which seems to have resolved the issue.    FAMILY HISTORY  His father had quadruple bypass surgery 10 years ago. He is 87 years old now.           Vital Signs:   Vitals:    11/13/24 1505   BP: 126/74   Pulse: 61   SpO2: 98%   Weight: 77.6 kg (171 lb)   Height: 180.3 cm (71\")            6/6/2024     3:00 PM   PHQ-2/PHQ-9 Depression Screening   Retired Little Interest or Pleasure in Doing Things 0-->not at all   Retired Feeling Down, Depressed or Hopeless 0-->not at all   Retired PHQ-9: Brief Depression Severity Measure Score 0       BMI is within normal parameters. No other follow-up for BMI required.        Physical Exam  Vitals reviewed.   Constitutional:       General: He is not in acute distress.  Eyes:      General: Lids are normal.      Conjunctiva/sclera: Conjunctivae normal.   Neck:      Vascular: No carotid bruit.      Trachea: No tracheal deviation.   Cardiovascular:      Rate and Rhythm: Normal rate and regular rhythm.      Heart sounds: Normal heart sounds. No murmur heard.  Pulmonary:      " Effort: Pulmonary effort is normal.      Breath sounds: Normal breath sounds.   Musculoskeletal:      Right lower leg: No edema.      Left lower leg: No edema.   Skin:     General: Skin is warm and dry.   Neurological:      Mental Status: He is alert. He is not disoriented.   Psychiatric:         Speech: Speech normal.         Behavior: Behavior normal. Behavior is cooperative.                 The following data was reviewed by: Michael Izquierdo MD on 11/13/2024:      Results  Laboratory Studies  BUN 13, creatinine 1.05. Glucose 99. Total cholesterol 212, LDL cholesterol 143.                Assessment and Plan        Pure hypercholesterolemia     Continue with heart healthy diet.  Further treatment options based on coronary calcium score test  Orders:    CT Cardiac Calcium Score Without Dye; Future    Screening for ischemic heart disease    Orders:    CT Cardiac Calcium Score Without Dye; Future    Screening for prostate cancer    Orders:    PSA SCREENING         Return if symptoms worsen or fail to improve.     Patient was given instructions and counseling regarding his condition or for health maintenance advice. Please see specific information pulled into the AVS if appropriate.     Patient or patient representative verbalized consent for the use of Ambient Listening during the visit with  Michael Izquierdo MD for chart documentation. 11/13/2024  15:45 EST

## 2024-11-14 LAB — PSA SERPL-MCNC: 0.7 NG/ML (ref 0–4)

## 2024-11-20 ENCOUNTER — TELEPHONE (OUTPATIENT)
Dept: FAMILY MEDICINE CLINIC | Facility: CLINIC | Age: 54
End: 2024-11-20

## 2024-11-20 NOTE — TELEPHONE ENCOUNTER
Caller: Memo Chang    Relationship: Self    Best call back number: [phone]     What is the best time to reach you: ANYTIME    What was the call regarding: PATIENT STATED DR GASTON ORDERS A CT CARDIAC CALCIUM, AND ADVISED HE WOULD GET THIS SCHEDULED FOR THE PATIENT.    PATIENT STATED HE HAS NOT HEARD BACK REGARDING THIS TEST, AND WOULD LIKE TO KNOW IF THIS WILL BE SCHEDULED, OR IF HE NEEDS TO LEVON SOMEWHERE TO SCHEDULE.    PLEASE CONTACT VIA [employer] TO ADVISE    Is it okay if the provider responds through [employer]: YES

## 2025-01-09 ENCOUNTER — OFFICE VISIT (OUTPATIENT)
Dept: FAMILY MEDICINE CLINIC | Facility: CLINIC | Age: 55
End: 2025-01-09
Payer: COMMERCIAL

## 2025-01-09 VITALS
OXYGEN SATURATION: 97 % | WEIGHT: 173 LBS | HEART RATE: 67 BPM | SYSTOLIC BLOOD PRESSURE: 118 MMHG | BODY MASS INDEX: 24.22 KG/M2 | DIASTOLIC BLOOD PRESSURE: 71 MMHG | HEIGHT: 71 IN

## 2025-01-09 DIAGNOSIS — R93.89 ABNORMAL X-RAY EXAMINATION: ICD-10-CM

## 2025-01-09 DIAGNOSIS — R10.12 LEFT UPPER QUADRANT ABDOMINAL PAIN: ICD-10-CM

## 2025-01-09 DIAGNOSIS — I71.21 ANEURYSM OF ASCENDING AORTA WITHOUT RUPTURE: Primary | ICD-10-CM

## 2025-01-09 PROCEDURE — 99214 OFFICE O/P EST MOD 30 MIN: CPT | Performed by: FAMILY MEDICINE

## 2025-01-09 NOTE — ASSESSMENT & PLAN NOTE
Aneurysm of ascending aorta is noted on recent exam.  Referral to cardiothoracic surgeon for evaluation and monitoring recommendations.  Orders:    Ambulatory Referral to Cardiothoracic Surgery

## 2025-01-09 NOTE — PROGRESS NOTES
Chief Complaint  Follow-up (CT)    Subjective        Abdominal Pain  This is a chronic problem. The current episode started more than 1 month ago. The onset quality is gradual. The problem occurs constantly. The most recent episode lasted 3 months. The problem has been coming and going. The pain is located in the LUQ. The pain is at a severity of 5/10. The quality of the pain is aching, cramping and a sensation of fullness. The abdominal pain radiates to the left shoulder. Associated symptoms include anorexia, arthralgias and belching. Pertinent negatives include no constipation, diarrhea, dysuria, fever, flatus, frequency, hematochezia, hematuria, melena, myalgias, nausea, vomiting or weight loss. The pain is aggravated by eating. The pain is relieved by Nothing. Prior diagnostic workup includes CT scan.         The patient is a 54-year-old male who presents for follow-up from the CT scan.    He reports that his blood work was normal, except for elevated LDL cholesterol levels, which prompted a calcium CT scan. The results of the scan were satisfactory, with a calcium score of zero. However, the scan also revealed three additional findings, including a 4.3 cm ascending thoracic aortic aneurysm. He expresses concern about this finding, despite feeling generally healthy.     He suspects that the splenic mass identified on the scan may be the source of his pain. He recalls experiencing similar pain during his last visit in 08/2024. The pain, located in the left upper abdomen, has been intermittent since 07/2024 or 08/2024. He describes the pain as severe, causing him to double over at times. Despite some relief from dietary modifications and exercise, the pain persists as a constant, dull ache.     Review of Systems   Constitutional:  Negative for fever and unexpected weight loss.   Gastrointestinal:  Positive for abdominal pain and anorexia. Negative for constipation, diarrhea, flatus, hematochezia, melena, nausea  "and vomiting.   Genitourinary:  Negative for dysuria, frequency and hematuria.   Musculoskeletal:  Positive for arthralgias. Negative for myalgias.        Vital Signs:   Vitals:    01/09/25 1144   BP: 118/71   Pulse: 67   SpO2: 97%   Weight: 78.5 kg (173 lb)   Height: 180.3 cm (71\")            1/9/2025    11:45 AM   PHQ-2/PHQ-9 Depression Screening   Little interest or pleasure in doing things Not at all   Feeling down, depressed, or hopeless Not at all       BMI is within normal parameters. No other follow-up for BMI required.        Physical Exam  Abdominal:      General: Abdomen is flat.      Palpations: There is no hepatomegaly, splenomegaly or mass.      Tenderness: There is abdominal tenderness in the left upper quadrant. There is guarding. There is no rebound.                     The following data was reviewed by: Michael Izquierdo MD on 01/09/2025:      Results  - Laboratory Studies:    - LDL cholesterol was high    - Imaging:    - Calcium CT scan showed a score of zero    - 4.3 cm ascending thoracic aortic aneurysm was found    - Multicyst left upper quadrant, possible splenic mass was identified                Assessment and Plan        Aneurysm of ascending aorta without rupture  Aneurysm of ascending aorta is noted on recent exam.  Referral to cardiothoracic surgeon for evaluation and monitoring recommendations.  Orders:    Ambulatory Referral to Cardiothoracic Surgery    Abnormal x-ray examination  Spleen noted on recent x-ray exam  Orders:    CT Abdomen Pelvis With Contrast; Future    Left upper quadrant abdominal pain  Because of the left upper abdomen pain since last summer that is intermittent in nature and recent abnormal x-ray, we will check with CT scan abdomen and pelvis and base our next steps on these test results.  Orders:    CT Abdomen Pelvis With Contrast; Future         Return if symptoms worsen or fail to improve.     Patient was given instructions and counseling regarding his condition or " for health maintenance advice. Please see specific information pulled into the AVS if appropriate.     Patient or patient representative verbalized consent for the use of Ambient Listening during the visit with  Michael Izquierdo MD for chart documentation. 1/9/2025  12:16 EST

## 2025-01-16 ENCOUNTER — HOSPITAL ENCOUNTER (OUTPATIENT)
Dept: CT IMAGING | Facility: HOSPITAL | Age: 55
Discharge: HOME | End: 2025-01-16
Admitting: FAMILY MEDICINE
Payer: COMMERCIAL

## 2025-01-16 ENCOUNTER — TELEPHONE (OUTPATIENT)
Dept: FAMILY MEDICINE CLINIC | Facility: CLINIC | Age: 55
End: 2025-01-16

## 2025-01-16 DIAGNOSIS — R10.12 LEFT UPPER QUADRANT ABDOMINAL PAIN: ICD-10-CM

## 2025-01-16 DIAGNOSIS — R93.89 ABNORMAL X-RAY EXAMINATION: ICD-10-CM

## 2025-01-16 PROCEDURE — 74177 CT ABD & PELVIS W/CONTRAST: CPT

## 2025-01-16 PROCEDURE — 25510000001 IOPAMIDOL 61 % SOLUTION: Performed by: FAMILY MEDICINE

## 2025-01-16 RX ORDER — IOPAMIDOL 612 MG/ML
100 INJECTION, SOLUTION INTRAVASCULAR
Status: COMPLETED | OUTPATIENT
Start: 2025-01-16 | End: 2025-01-16

## 2025-01-16 RX ADMIN — IOPAMIDOL 85 ML: 612 INJECTION, SOLUTION INTRAVENOUS at 09:52

## 2025-01-16 NOTE — TELEPHONE ENCOUNTER
Caller: Memo Chang    Relationship: Self    Best call back number: [phone]    What is the best time to reach you: ANYTIME    Who are you requesting to speak with (clinical staff, provider,  specific staff member): DR GASTON OR CLINICAL    Do you know the name of the person who called: DR GASTON    What was the call regarding: PATIENT WAS RETURNING A CALL TO DR GASTON ABOUT RESULTS  BUT HUB WAS UNABLE TO WARM TRANSFER. PATIENT IS REQUESTING A CALLBACK.

## 2025-01-16 NOTE — PROGRESS NOTES
Please give the patient the following message:  Moises, I tried calling your mobile phone just now but there was no answer.  Your recent CT scan of the abdomen and pelvis with contrast did show some abnormalities of the spleen.  I have set up a referral for you to see a surgeon regarding this abnormality.  Next steps will be depending on what the surgeon says.  Please share these test results with the surgeon during that visit.  Give us a call if you have any questions.  Dr. Izquierdo

## 2025-01-20 ENCOUNTER — OFFICE VISIT (OUTPATIENT)
Dept: SURGERY | Facility: CLINIC | Age: 55
End: 2025-01-20
Payer: COMMERCIAL

## 2025-01-20 VITALS
HEART RATE: 80 BPM | DIASTOLIC BLOOD PRESSURE: 78 MMHG | HEIGHT: 71 IN | SYSTOLIC BLOOD PRESSURE: 122 MMHG | OXYGEN SATURATION: 99 % | WEIGHT: 175 LBS | BODY MASS INDEX: 24.5 KG/M2

## 2025-01-20 DIAGNOSIS — R16.1 SPLENIC MASS: Primary | ICD-10-CM

## 2025-01-20 DIAGNOSIS — R22.31 FOREARM MASS, RIGHT: ICD-10-CM

## 2025-01-20 PROBLEM — Z90.81 H/O SPLENECTOMY: Status: ACTIVE | Noted: 2025-01-20

## 2025-01-20 PROCEDURE — 99204 OFFICE O/P NEW MOD 45 MIN: CPT | Performed by: SURGERY

## 2025-01-20 NOTE — PROGRESS NOTES
General Surgery  Initial Office Visit    CC: Splenic mass    HPI: The patient is a pleasant 54 y.o. year-old gentleman who presents today for evaluation of an incidentally discovered mass of the spleen within his left upper quadrant seen during a CT cardiac calcium score performed for coronary artery disease screening.  He then underwent CT abdomen/pelvis which demonstrated a large solid and cystic multiseptated splenic mass encompassing the majority of the spleen of unknown etiology.  He was referred to see me.  He reports dull achiness of the left upper quadrant that has been ongoing intermittently since September of last year.  It has become progressively more constant with resulting early satiety and lack of appetite.  He denies any fever, chills, night sweats, or unintentional weight loss.  He also mentions a small palpable lump within the medial anterior aspect of his right forearm which has been present for about 2 months with no tenderness.  The mass has gradually enlarged in size and he has had no imaging workup of this.  He has no family history of lymphoproliferative disorders, but both of his grandfathers had colon cancer and he has never previously undergone colonoscopic evaluation.    Past Medical History:   Hyperlipidemia  GERD    Past Surgical History:   Right cheek skin lesion  [place] tooth extraction    Medications:   Aspirin 81 mg daily  Magnesium supplement once daily  Nurtec as needed for migraine headache  Vitamin B12 100 mcg daily    Allergies: No known drug allergies    Family History: Both his maternal and paternal grandfathers had colon cancer    Social History: , non-smoker, no regular alcohol use    ROS:   Constitutional: Negative for fevers or chills  HENT: Negative for hearing loss or runny nose  Eyes: Negative for vision changes or scleral icterus  Respiratory: Negative for cough or shortness of breath  Cardiovascular: Negative for chest pain or heart  palpitations  Gastrointestinal: Positive for abdominal pain; negative for abdominal distension, nausea, vomiting, constipation, melena, or hematochezia  Genitourinary: Negative for hematuria or dysuria  Musculoskeletal: Negative for joint swelling or gait instability  Neurologic: Negative for tremors or seizures  Psychiatric: Negative for suicidal ideations or agitation  All other systems reviewed and negative    Physical Exam:  Height: 180 cm  Weight: 79 kg  BMI: 24.41  General: No acute distress, well-nourished & well-developed  HEAD: normocephalic, atraumatic  EYES: normal conjunctiva, sclera anicteric  EARS: grossly normal hearing  NECK: supple, no thyromegaly  CARDIOVASCULAR: regular rate and rhythm  RESPIRATORY: clear to auscultation bilaterally  GASTROINTESTINAL: soft, nontender, non-distended, massive splenomegaly within the left upper abdomen with the edge of the spleen palpable close to midline  MUSCULOSKELETAL: Small 1.5 cm mobile lipoma of the posterior right forearm, there is an additional 1.5 cm mobile subcutaneous mass of the anterior right forearm of unknown etiology with no overlying skin erythema or fluctuance  PSYCHIATRIC: oriented x3, normal mood and affect    BMI is within normal parameters. No other follow-up for BMI required.      IMAGING:  CT ABD/PELVIS (1/16/2025):  1. Large solid and cystic multiseptated splenic mass with peripheral and septal enhancement results in mass effect on surrounding structures in the left upper quadrant including narrowing of the splenic vein and left renal vein. Some differential considerations include large splenic hemangioma, splenic hamartoma, sclerosing adenomatoid nodular transformation, lymphangioma. Would be difficult to exclude angiosarcoma on this single phase. Mass could be better characterized with a multiphasic MRI. Recommend surgical evaluation secondary to the uncertain etiology and the mass effect on surrounding structures.  2. Hypoattenuating mass  with peripheral nodular interrupted enhancement in the inferior right hepatic lobe is most suggestive of a hemangioma. Can be fully characterized and confirmed with a liver MRI.    ASSESSMENT & PLAN  Mr. Chang is a 54-year-old gentleman with a large multiseptated splenic mass of unknown etiology.  I reviewed his CT scan done last week and discussed the results with him.  I suspect this represents a benign hemangioma, but I cannot rule out a malignant process such as a lymphoma, sarcoma, etc.  The spleen unfortunately cannot be needle biopsied safely due to the risk of rupture and massive bleeding.  I have recommended we check a PET/CT to determine if there are any other hyperactive lymph nodes throughout the chest, abdomen, or pelvis suggestive of a lymphoma.  If so, one of these could be needle biopsied for tissue diagnosis.  If there are no other PET avid lesions, we would need to proceed with splenectomy for tissue diagnosis.  In preparation for possible splenectomy, I have ordered the postsplenectomy vaccines (Prevnar-20, Menveo, Bexsero, Hiberix) to be given in our [facility] infusion center.  I have also ordered a soft tissue ultrasound of the right forearm to investigate the anterior soft tissue mass which could be an enlarged lymph node versus lipoma.  I would like for him to return and see me in the office after his PET/CT has been completed so we can discuss next steps.    Radha Paredes MD  General, Robotic, and Endoscopic Surgery  [facility]    [address], [address]  [place] [address]  P: [phone]  F: [phone]

## 2025-01-22 ENCOUNTER — HOSPITAL ENCOUNTER (OUTPATIENT)
Dept: INFUSION THERAPY | Facility: HOSPITAL | Age: 55
Discharge: HOME | End: 2025-01-22
Admitting: SURGERY
Payer: COMMERCIAL

## 2025-01-22 VITALS
SYSTOLIC BLOOD PRESSURE: 128 MMHG | RESPIRATION RATE: 16 BRPM | TEMPERATURE: 96.9 F | DIASTOLIC BLOOD PRESSURE: 94 MMHG | OXYGEN SATURATION: 100 % | HEART RATE: 70 BPM

## 2025-01-22 DIAGNOSIS — Z90.81 H/O SPLENECTOMY: Primary | ICD-10-CM

## 2025-01-22 PROCEDURE — 90677 PCV20 VACCINE IM: CPT | Performed by: SURGERY

## 2025-01-22 PROCEDURE — G0009 ADMIN PNEUMOCOCCAL VACCINE: HCPCS | Performed by: SURGERY

## 2025-01-22 PROCEDURE — 90734 MENACWYD/MENACWYCRM VACC IM: CPT | Performed by: SURGERY

## 2025-01-22 PROCEDURE — 90472 IMMUNIZATION ADMIN EACH ADD: CPT | Performed by: SURGERY

## 2025-01-22 PROCEDURE — 90471 IMMUNIZATION ADMIN: CPT | Performed by: SURGERY

## 2025-01-22 PROCEDURE — 96372 THER/PROPH/DIAG INJ SC/IM: CPT

## 2025-01-22 PROCEDURE — 90648 HIB PRP-T VACCINE 4 DOSE IM: CPT | Performed by: SURGERY

## 2025-01-22 PROCEDURE — 25010000002 MENINGOCOCCAL RECONSTITUTED SOLUTION: Performed by: SURGERY

## 2025-01-22 PROCEDURE — G0008 ADMIN INFLUENZA VIRUS VAC: HCPCS | Performed by: SURGERY

## 2025-01-22 PROCEDURE — 25010000002 HAEMOPHILUS B POLYSAC-TETANUS TOXOID 10 MCG RECONSTITUTED SOLUTION: Performed by: SURGERY

## 2025-01-22 PROCEDURE — 90620 MENB-4C VACCINE IM: CPT | Performed by: SURGERY

## 2025-01-22 PROCEDURE — 25010000002 MENINGOCOCCAL B SUSPENSION PREFILLED SYRINGE: Performed by: SURGERY

## 2025-01-22 PROCEDURE — 25010000002 PNEUMOCOCCAL 20-VAL CONJ VACC 0.5 ML SUSPENSION PREFILLED SYRINGE: Performed by: SURGERY

## 2025-01-22 RX ADMIN — Medication 0.5 ML: at 09:46

## 2025-01-22 RX ADMIN — Medication 0.5 ML: at 09:59

## 2025-01-22 RX ADMIN — PNEUMOCOCCAL 20-VALENT CONJUGATE VACCINE 0.5 ML
2.2; 2.2; 2.2; 2.2; 2.2; 2.2; 2.2; 2.2; 2.2; 2.2; 2.2; 2.2; 2.2; 2.2; 2.2; 2.2; 4.4; 2.2; 2.2; 2.2 INJECTION, SUSPENSION INTRAMUSCULAR at 09:54

## 2025-01-22 RX ADMIN — NEISSERIA MENINGITIDIS SEROGROUP B NHBA FUSION PROTEIN ANTIGEN, NEISSERIA MENINGITIDIS SEROGROUP B FHBP FUSION PROTEIN ANTIGEN AND NEISSERIA MENINGITIDIS SEROGROUP B NADA PROTEIN ANTIGEN 0.5 ML: 50; 50; 50; 25 INJECTION, SUSPENSION INTRAMUSCULAR at 09:50

## 2025-01-24 ENCOUNTER — HOSPITAL ENCOUNTER (OUTPATIENT)
Dept: PET IMAGING | Facility: HOSPITAL | Age: 55
Discharge: HOME | End: 2025-01-24
Payer: COMMERCIAL

## 2025-01-24 ENCOUNTER — HOSPITAL ENCOUNTER (OUTPATIENT)
Dept: ULTRASOUND IMAGING | Facility: HOSPITAL | Age: 55
Discharge: HOME | End: 2025-01-24
Payer: COMMERCIAL

## 2025-01-24 DIAGNOSIS — R22.31 FOREARM MASS, RIGHT: ICD-10-CM

## 2025-01-24 DIAGNOSIS — R16.1 SPLENIC MASS: ICD-10-CM

## 2025-01-24 LAB — GLUCOSE BLDC GLUCOMTR-MCNC: 101 MG/DL (ref 70–130)

## 2025-01-24 PROCEDURE — 78815 PET IMAGE W/CT SKULL-THIGH: CPT

## 2025-01-24 PROCEDURE — A9552 F18 FDG: HCPCS | Performed by: SURGERY

## 2025-01-24 PROCEDURE — 76882 US LMTD JT/FCL EVL NVASC XTR: CPT

## 2025-01-24 PROCEDURE — 34310000005 FLUDEOXYGLUCOSE F18 SOLUTION: Performed by: SURGERY

## 2025-01-24 PROCEDURE — 82948 REAGENT STRIP/BLOOD GLUCOSE: CPT

## 2025-01-24 RX ADMIN — FLUDEOXYGLUCOSE F 18 1 DOSE: 200 INJECTION, SOLUTION INTRAVENOUS at 07:09

## 2025-02-03 ENCOUNTER — OFFICE VISIT (OUTPATIENT)
Dept: SURGERY | Facility: CLINIC | Age: 55
End: 2025-02-03
Payer: COMMERCIAL

## 2025-02-03 VITALS
OXYGEN SATURATION: 98 % | SYSTOLIC BLOOD PRESSURE: 130 MMHG | DIASTOLIC BLOOD PRESSURE: 82 MMHG | HEART RATE: 60 BPM | BODY MASS INDEX: 24.44 KG/M2 | WEIGHT: 174.6 LBS | HEIGHT: 71 IN

## 2025-02-03 DIAGNOSIS — R22.31 FOREARM MASS, RIGHT: ICD-10-CM

## 2025-02-03 DIAGNOSIS — R16.1 SPLENIC MASS: Primary | ICD-10-CM

## 2025-02-03 PROCEDURE — 99214 OFFICE O/P EST MOD 30 MIN: CPT | Performed by: SURGERY

## 2025-02-03 PROCEDURE — 88304 TISSUE EXAM BY PATHOLOGIST: CPT | Performed by: SURGERY

## 2025-02-03 NOTE — PROGRESS NOTES
General Surgery  Established Patient Office Visit    CC: Follow-up splenic mass    HPI: The patient is a pleasant 54 y.o. year-old gentleman who presents today for discussion of an incidentally discovered mass of the spleen within his left upper quadrant seen during a CT cardiac calcium score performed for coronary artery disease screening.  He then underwent CT abdomen/pelvis which demonstrated a large solid and cystic multiseptated splenic mass encompassing the majority of the spleen of unknown etiology.  He was referred to see me.  He reports dull achiness of the left upper quadrant that has been ongoing intermittently since September of last year.  It has become progressively more constant with resulting early satiety and lack of appetite.  He denies any fever, chills, night sweats, or unintentional weight loss.  He also mentions a small palpable lump within the medial anterior aspect of his right forearm which has been present for about 2 months with no tenderness.  The mass has gradually enlarged in size.  He has no family history of lymphoproliferative disorders, but both of his grandfathers had colon cancer and he has never previously undergone colonoscopic evaluation. When I saw him last two weeks ago I sent him for PET/CT scan and ultrasonography of the forearm lesion. The PET scan showed the spleen was not hyperactive, suggesting this may be a benign splenic mass. There was no other lymphadenopathy throughout the neck, chest, abdomen, or pelvis so a lymphoma is less likely. Lastly, his forearm mass appeared to be a nondescript 9 mm subcutaneous lesion.  I saw him last, he received vaccinations for Streptococcus pneumoniae, haemophilus influenzae, and Neisseria meningitis in [facility] on 1/22.    Past Medical History:   Hyperlipidemia  GERD    Past Surgical History:   Right cheek skin lesion  [place] tooth extraction    Medications:   Aspirin 81 mg daily  Magnesium supplement once daily  Nurtec as needed for  migraine headache  Vitamin B12 100 mcg daily    Allergies: No known drug allergies    Family History: Both his maternal and paternal grandfathers had colon cancer    Social History: , non-smoker, no regular alcohol use    ROS:   Constitutional: Negative for fevers or chills  HENT: Negative for hearing loss or runny nose  Eyes: Negative for vision changes or scleral icterus  Respiratory: Negative for cough or shortness of breath  Cardiovascular: Negative for chest pain or heart palpitations  Gastrointestinal: Positive for abdominal pain; negative for abdominal distension, nausea, vomiting, constipation, melena, or hematochezia  Genitourinary: Negative for hematuria or dysuria  Musculoskeletal: Negative for joint swelling or gait instability  Neurologic: Negative for tremors or seizures  Psychiatric: Negative for suicidal ideations or agitation  All other systems reviewed and negative    Physical Exam:  Height: 180 cm  Weight: 79 kg  BMI: 24.41  General: No acute distress, well-nourished & well-developed  HEAD: normocephalic, atraumatic  EYES: normal conjunctiva, sclera anicteric  EARS: grossly normal hearing  NECK: supple, no thyromegaly  CARDIOVASCULAR: regular rate and rhythm  RESPIRATORY: clear to auscultation bilaterally  GASTROINTESTINAL: soft, nontender, non-distended, massive splenomegaly within the left upper abdomen with the edge of the spleen palpable close to midline  MUSCULOSKELETAL: Small 1 cm mobile lipoma of the anterior right forearm  PSYCHIATRIC: oriented x3, normal mood and affect    BMI is within normal parameters. No other follow-up for BMI required.    IMAGING:  PET/CT (1/24/2025):  FINDINGS: Mediastinal blood pool has a 2.4 max SUV.  1. The spleen is nearly replaced by a very large heterogeneous mass which measures approximately 17 x 14 cm, and an approximately 11 x 10 cm portion of the mass is photopenic/necrotic and the rest of the mass has a 3.2 max SUV which is nonspecific. There is  no hypermetabolic lymphadenopathy at the abdomen or pelvis and there is no hypermetabolic lymphadenopathy at the thorax or neck.   2. There is a subcentimeter subcutaneous nodule at the right anterior chest wall, approximately 4 cm superior to the nipple, which has a 1.1 max SUV. Likely benign, but please correlate clinically.   3. There is no suspicious hypermetabolic activity at the neck. There is no suspicious hypermetabolic activity at the thorax. Other than the spleen, there is no abnormal hypermetabolic activity within the abdomen or pelvis.  4. There is no suspicious bone activity.    ULTRASOUND NONVASCULAR RIGHT UPPER EXTREMITY (1/24/2025):  Sonography performed along the right forearm encompassing the area of interest. Centered within the subcutaneous fat layer is a 9 x 3 x 9 mm ovoid solid hyperechoic area. This does not have the typical appearance of a lymph node or sebaceous cyst. Differential to include a focal area of edema, lipoma or other soft tissue tumor. There is no involvement of the overlying skin or underlying musculature. This area can be easily biopsied percutaneously with sonographic guidance if clinically needed. The remainder is unremarkable.    CT ABD/PELVIS (1/16/2025):  1. Large solid and cystic multiseptated splenic mass with peripheral and septal enhancement results in mass effect on surrounding structures in the left upper quadrant including narrowing of the splenic vein and left renal vein. Some differential considerations include large splenic hemangioma, splenic hamartoma, sclerosing adenomatoid nodular transformation, lymphangioma. Would be difficult to exclude angiosarcoma on this single phase. Mass could be better characterized with a multiphasic MRI. Recommend surgical evaluation secondary to the uncertain etiology and the mass effect on surrounding structures.  2. Hypoattenuating mass with peripheral nodular interrupted enhancement in the inferior right hepatic lobe is most  suggestive of a hemangioma. Can be fully characterized and confirmed with a liver MRI.    Procedure Note:  Pre-Operative Diagnosis: Subcutaneous mass right forearm  Post-Operative Diagnosis: Same  Procedure performed: Excision 1 cm superficial subcutaneous lipoma right forearm  Surgeon: Radha Paredes MD  Assistant: None  Anesthesia: Local (1% Lidocaine with epinephrine)  Complications: None  EBL: Minimal  Specimens: Right forearm subcutaneous mass  Description of Procedure: The patient was brought to the minor procedure room and laid beside the exam table with his right arm propped on the exam table.  The superficial subcutaneous mass was palpable within the anterior forearm and the overlying skin prepped and draped in a sterile fashion with Hibiclens soap.  A surgical timeout was completed.  The skin overlying the subcutaneous mass was anesthetized using 0.5% Marcaine with epinephrine.  A 1 cm skin incision was made and the underlying subcutaneous fat divided to a firm 1 cm lipoma.  This was slowly  from its surrounding soft tissue attachments using iris scissors.  It was eviscerated from the wound and passed off to pathology in formalin.  The incision was then closed using interrupted 4-0 Vicryl subcuticular suture with topical Exofin glue.  He tolerated the procedure well.    ASSESSMENT & PLAN  Mr. Chang is a 54-year-old gentleman with a large multiseptated splenic mass, most likely a benign hemangioma based on PET/CT, but cannot definitively rule out a malignant process. His PET scan showed no other abnormal lesions able to be biopsied, ie no lymphadenopathy to suggest a lymphoma. Therefore I would recommend scheduling him for an open splenectomy to obtain tissue for diagnosis.  He has already received the first round of postsplenectomy vaccines on 1/22 so we could safely perform the surgery as early as this Wednesday.  I discussed with him the risks of an open splenectomy which include bleeding  (2 units of packed red blood cells will be held in preparation for the surgery), wound infection, and possible damage to structures around the spleen such as the stomach, pancreas, kidney, and colon.  We also briefly touched on the possibility of a pancreatic fistula/leak postoperatively for which a DONTAE drain will be left in place.  Despite the risks of surgery, he has consented to proceed.  Regarding the right forearm subcutaneous mass, I removed this today for tissue diagnosis and it appears to be a benign lipoma unrelated to his splenic mass.  I will call him with the pathology results in a few days.    Radha Paredes MD  General, Robotic, and Endoscopic Surgery  [facility]    [address], [address]  [place] [address]  P: [phone]  F: [phone]

## 2025-02-03 NOTE — H&P (VIEW-ONLY)
General Surgery  Established Patient Office Visit    CC: Follow-up splenic mass    HPI: The patient is a pleasant 54 y.o. year-old gentleman who presents today for discussion of an incidentally discovered mass of the spleen within his left upper quadrant seen during a CT cardiac calcium score performed for coronary artery disease screening.  He then underwent CT abdomen/pelvis which demonstrated a large solid and cystic multiseptated splenic mass encompassing the majority of the spleen of unknown etiology.  He was referred to see me.  He reports dull achiness of the left upper quadrant that has been ongoing intermittently since September of last year.  It has become progressively more constant with resulting early satiety and lack of appetite.  He denies any fever, chills, night sweats, or unintentional weight loss.  He also mentions a small palpable lump within the medial anterior aspect of his right forearm which has been present for about 2 months with no tenderness.  The mass has gradually enlarged in size.  He has no family history of lymphoproliferative disorders, but both of his grandfathers had colon cancer and he has never previously undergone colonoscopic evaluation. When I saw him last two weeks ago I sent him for PET/CT scan and ultrasonography of the forearm lesion. The PET scan showed the spleen was not hyperactive, suggesting this may be a benign splenic mass. There was no other lymphadenopathy throughout the neck, chest, abdomen, or pelvis so a lymphoma is less likely. Lastly, his forearm mass appeared to be a nondescript 9 mm subcutaneous lesion.  I saw him last, he received vaccinations for Streptococcus pneumoniae, haemophilus influenzae, and Neisseria meningitis in [facility] on 1/22.    Past Medical History:   Hyperlipidemia  GERD    Past Surgical History:   Right cheek skin lesion  [place] tooth extraction    Medications:   Aspirin 81 mg daily  Magnesium supplement once daily  Nurtec as needed for  migraine headache  Vitamin B12 100 mcg daily    Allergies: No known drug allergies    Family History: Both his maternal and paternal grandfathers had colon cancer    Social History: , non-smoker, no regular alcohol use    ROS:   Constitutional: Negative for fevers or chills  HENT: Negative for hearing loss or runny nose  Eyes: Negative for vision changes or scleral icterus  Respiratory: Negative for cough or shortness of breath  Cardiovascular: Negative for chest pain or heart palpitations  Gastrointestinal: Positive for abdominal pain; negative for abdominal distension, nausea, vomiting, constipation, melena, or hematochezia  Genitourinary: Negative for hematuria or dysuria  Musculoskeletal: Negative for joint swelling or gait instability  Neurologic: Negative for tremors or seizures  Psychiatric: Negative for suicidal ideations or agitation  All other systems reviewed and negative    Physical Exam:  Height: 180 cm  Weight: 79 kg  BMI: 24.41  General: No acute distress, well-nourished & well-developed  HEAD: normocephalic, atraumatic  EYES: normal conjunctiva, sclera anicteric  EARS: grossly normal hearing  NECK: supple, no thyromegaly  CARDIOVASCULAR: regular rate and rhythm  RESPIRATORY: clear to auscultation bilaterally  GASTROINTESTINAL: soft, nontender, non-distended, massive splenomegaly within the left upper abdomen with the edge of the spleen palpable close to midline  MUSCULOSKELETAL: Small 1 cm mobile lipoma of the anterior right forearm  PSYCHIATRIC: oriented x3, normal mood and affect    BMI is within normal parameters. No other follow-up for BMI required.    IMAGING:  PET/CT (1/24/2025):  FINDINGS: Mediastinal blood pool has a 2.4 max SUV.  1. The spleen is nearly replaced by a very large heterogeneous mass which measures approximately 17 x 14 cm, and an approximately 11 x 10 cm portion of the mass is photopenic/necrotic and the rest of the mass has a 3.2 max SUV which is nonspecific. There is  no hypermetabolic lymphadenopathy at the abdomen or pelvis and there is no hypermetabolic lymphadenopathy at the thorax or neck.   2. There is a subcentimeter subcutaneous nodule at the right anterior chest wall, approximately 4 cm superior to the nipple, which has a 1.1 max SUV. Likely benign, but please correlate clinically.   3. There is no suspicious hypermetabolic activity at the neck. There is no suspicious hypermetabolic activity at the thorax. Other than the spleen, there is no abnormal hypermetabolic activity within the abdomen or pelvis.  4. There is no suspicious bone activity.    ULTRASOUND NONVASCULAR RIGHT UPPER EXTREMITY (1/24/2025):  Sonography performed along the right forearm encompassing the area of interest. Centered within the subcutaneous fat layer is a 9 x 3 x 9 mm ovoid solid hyperechoic area. This does not have the typical appearance of a lymph node or sebaceous cyst. Differential to include a focal area of edema, lipoma or other soft tissue tumor. There is no involvement of the overlying skin or underlying musculature. This area can be easily biopsied percutaneously with sonographic guidance if clinically needed. The remainder is unremarkable.    CT ABD/PELVIS (1/16/2025):  1. Large solid and cystic multiseptated splenic mass with peripheral and septal enhancement results in mass effect on surrounding structures in the left upper quadrant including narrowing of the splenic vein and left renal vein. Some differential considerations include large splenic hemangioma, splenic hamartoma, sclerosing adenomatoid nodular transformation, lymphangioma. Would be difficult to exclude angiosarcoma on this single phase. Mass could be better characterized with a multiphasic MRI. Recommend surgical evaluation secondary to the uncertain etiology and the mass effect on surrounding structures.  2. Hypoattenuating mass with peripheral nodular interrupted enhancement in the inferior right hepatic lobe is most  suggestive of a hemangioma. Can be fully characterized and confirmed with a liver MRI.    Procedure Note:  Pre-Operative Diagnosis: Subcutaneous mass right forearm  Post-Operative Diagnosis: Same  Procedure performed: Excision 1 cm superficial subcutaneous lipoma right forearm  Surgeon: Radha Paredes MD  Assistant: None  Anesthesia: Local (1% Lidocaine with epinephrine)  Complications: None  EBL: Minimal  Specimens: Right forearm subcutaneous mass  Description of Procedure: The patient was brought to the minor procedure room and laid beside the exam table with his right arm propped on the exam table.  The superficial subcutaneous mass was palpable within the anterior forearm and the overlying skin prepped and draped in a sterile fashion with Hibiclens soap.  A surgical timeout was completed.  The skin overlying the subcutaneous mass was anesthetized using 0.5% Marcaine with epinephrine.  A 1 cm skin incision was made and the underlying subcutaneous fat divided to a firm 1 cm lipoma.  This was slowly  from its surrounding soft tissue attachments using iris scissors.  It was eviscerated from the wound and passed off to pathology in formalin.  The incision was then closed using interrupted 4-0 Vicryl subcuticular suture with topical Exofin glue.  He tolerated the procedure well.    ASSESSMENT & PLAN  Mr. Chang is a 54-year-old gentleman with a large multiseptated splenic mass, most likely a benign hemangioma based on PET/CT, but cannot definitively rule out a malignant process. His PET scan showed no other abnormal lesions able to be biopsied, ie no lymphadenopathy to suggest a lymphoma. Therefore I would recommend scheduling him for an open splenectomy to obtain tissue for diagnosis.  He has already received the first round of postsplenectomy vaccines on 1/22 so we could safely perform the surgery as early as this Wednesday.  I discussed with him the risks of an open splenectomy which include bleeding  (2 units of packed red blood cells will be held in preparation for the surgery), wound infection, and possible damage to structures around the spleen such as the stomach, pancreas, kidney, and colon.  We also briefly touched on the possibility of a pancreatic fistula/leak postoperatively for which a DONTAE drain will be left in place.  Despite the risks of surgery, he has consented to proceed.  Regarding the right forearm subcutaneous mass, I removed this today for tissue diagnosis and it appears to be a benign lipoma unrelated to his splenic mass.  I will call him with the pathology results in a few days.    Radha Paredes MD  General, Robotic, and Endoscopic Surgery  [facility]    [address], [address]  [place] [address]  P: [phone]  F: [phone]

## 2025-02-05 ENCOUNTER — PRE-ADMISSION TESTING (OUTPATIENT)
Dept: PREADMISSION TESTING | Facility: HOSPITAL | Age: 55
DRG: 358 | End: 2025-02-05
Payer: COMMERCIAL

## 2025-02-05 VITALS
OXYGEN SATURATION: 100 % | SYSTOLIC BLOOD PRESSURE: 119 MMHG | HEART RATE: 74 BPM | BODY MASS INDEX: 23.6 KG/M2 | RESPIRATION RATE: 16 BRPM | DIASTOLIC BLOOD PRESSURE: 73 MMHG | HEIGHT: 72 IN | TEMPERATURE: 97.9 F | WEIGHT: 174.2 LBS

## 2025-02-05 DIAGNOSIS — R16.1 SPLENIC MASS: ICD-10-CM

## 2025-02-05 LAB
ABO GROUP BLD: NORMAL
ANION GAP SERPL CALCULATED.3IONS-SCNC: 12.8 MMOL/L (ref 5–15)
BLD GP AB SCN SERPL QL: NEGATIVE
BUN SERPL-MCNC: 15 MG/DL (ref 6–20)
BUN/CREAT SERPL: 11.4 (ref 7–25)
CALCIUM SPEC-SCNC: 8.7 MG/DL (ref 8.6–10.5)
CHLORIDE SERPL-SCNC: 104 MMOL/L (ref 98–107)
CO2 SERPL-SCNC: 23.2 MMOL/L (ref 22–29)
CREAT SERPL-MCNC: 1.32 MG/DL (ref 0.76–1.27)
CYTO UR: NORMAL
DEPRECATED RDW RBC AUTO: 41.7 FL (ref 37–54)
EGFRCR SERPLBLD CKD-EPI 2021: 63.7 ML/MIN/1.73
ERYTHROCYTE [DISTWIDTH] IN BLOOD BY AUTOMATED COUNT: 13.5 % (ref 12.3–15.4)
GLUCOSE SERPL-MCNC: 115 MG/DL (ref 65–99)
HCT VFR BLD AUTO: 35.7 % (ref 37.5–51)
HGB BLD-MCNC: 11.9 G/DL (ref 13–17.7)
LAB AP CASE REPORT: NORMAL
MCH RBC QN AUTO: 28.5 PG (ref 26.6–33)
MCHC RBC AUTO-ENTMCNC: 33.3 G/DL (ref 31.5–35.7)
MCV RBC AUTO: 85.4 FL (ref 79–97)
PATH REPORT.FINAL DX SPEC: NORMAL
PATH REPORT.GROSS SPEC: NORMAL
PLATELET # BLD AUTO: 153 10*3/MM3 (ref 140–450)
PMV BLD AUTO: 11.2 FL (ref 6–12)
POTASSIUM SERPL-SCNC: 4.1 MMOL/L (ref 3.5–5.2)
QT INTERVAL: 445 MS
QTC INTERVAL: 406 MS
RBC # BLD AUTO: 4.18 10*6/MM3 (ref 4.14–5.8)
RH BLD: POSITIVE
SODIUM SERPL-SCNC: 140 MMOL/L (ref 136–145)
T&S EXPIRATION DATE: NORMAL
WBC NRBC COR # BLD AUTO: 5.93 10*3/MM3 (ref 3.4–10.8)

## 2025-02-05 PROCEDURE — 85027 COMPLETE CBC AUTOMATED: CPT

## 2025-02-05 PROCEDURE — 93005 ELECTROCARDIOGRAM TRACING: CPT

## 2025-02-05 PROCEDURE — 80048 BASIC METABOLIC PNL TOTAL CA: CPT

## 2025-02-05 PROCEDURE — 86901 BLOOD TYPING SEROLOGIC RH(D): CPT

## 2025-02-05 PROCEDURE — 36415 COLL VENOUS BLD VENIPUNCTURE: CPT

## 2025-02-05 PROCEDURE — 93010 ELECTROCARDIOGRAM REPORT: CPT | Performed by: INTERNAL MEDICINE

## 2025-02-05 PROCEDURE — 86900 BLOOD TYPING SEROLOGIC ABO: CPT

## 2025-02-05 PROCEDURE — 86850 RBC ANTIBODY SCREEN: CPT

## 2025-02-05 NOTE — DISCHARGE INSTRUCTIONS
Take the following medications the morning of surgery:    NONE      If you are on prescription narcotic pain medication to control your pain you may also take that medication the morning of surgery.      General Instructions:     Do not eat solid food after midnight the night before surgery.  Clear liquids day of surgery are allowed but must be stopped at least two hours before your hospital arrival time.       Allowed clear liquids      Water, sodas, and tea or coffee with no cream or milk added.       12 to 20 ounces of a clear liquid that contains carbohydrates is recommended.  If non-diabetic, have Gatorade or Powerade.  If diabetic, have G2 or Powerade Zero.     Do not have liquids red in color.  Do not consume chicken, beef, pork or vegetable broth or bouillon cubes of any variety as they are not considered clear liquids and are not allowed.      Infants may have breast milk up to four hours before surgery.  Infants drinking formula may drink formula up to six hours before surgery.   Patients who avoid smoking, chewing tobacco and alcohol for 4 weeks prior to surgery have a reduced risk of post-operative complications.  Quit smoking as many days before surgery as you can.  Do not smoke, use chewing tobacco or drink alcohol the day of surgery.   If applicable bring your C-PAP/ BI-PAP machine in with you to preop day of surgery.  Bring any papers given to you in the doctor’s office.  Wear clean comfortable clothes.  Do not wear contact lenses, false eyelashes or make-up.  Bring a case for your glasses.   Bring crutches or walker if applicable.  Remove all piercings.  Leave jewelry and any other valuables at home.  Hair extensions with metal clips must be removed prior to surgery.  The Pre-Admission Testing nurse will instruct you to bring medications if unable to obtain an accurate list in Pre-Admission Testing.        If you were given a blood bank ID arm band remember to bring it with you the day of  surgery.    Preventing a Surgical Site Infection:  For 2 to 3 days before surgery, avoid shaving with a razor because the razor can irritate skin and make it easier to develop an infection.    Any areas of open skin can increase the risk of a post-operative wound infection by allowing bacteria to enter and travel throughout the body.  Notify your surgeon if you have any skin wounds / rashes even if it is not near the expected surgical site.  The area will need assessed to determine if surgery should be delayed until it is healed.  The night prior to surgery shower using a fresh bar of anti-bacterial soap (such as Dial) and clean washcloth.  Sleep in a clean bed with clean clothing.  Do not allow pets to sleep with you.  Shower on the morning of surgery using a fresh bar of anti-bacterial soap (such as Dial) and clean washcloth.  Dry with a clean towel and dress in clean clothing.  Ask your surgeon if you will be receiving antibiotics prior to surgery.  Make sure you, your family, and all healthcare providers clean their hands with soap and water or an alcohol based hand  before caring for you or your wound.    Day of surgery:  Your arrival time is approximately two hours before your scheduled surgery time.  Please note if you have an early arrival time the surgery doors do not open before 5:00 AM.  Upon arrival, a Pre-op nurse and Anesthesiologist will review your health history, obtain vital signs, and answer questions you may have.  The only belongings needed at this time will be a list of your home medications and if applicable your C-PAP/BI-PAP machine.  A Pre-op nurse will start an IV and you may receive medication in preparation for surgery, including something to help you relax.     Please be aware that surgery does come with discomfort.  We want to make every effort to control your discomfort so please discuss any uncontrolled symptoms with your nurse.   Your doctor will most likely have prescribed  pain medications.      If you are going home after surgery you will receive individualized written care instructions before being discharged.  A responsible adult must drive you to and from the hospital on the day of your surgery and ideally stay with you through the night.   .  Discharge prescriptions can be filled by the hospital pharmacy during regular pharmacy hours.  If you are having surgery late in the day/evening your prescription may be e-prescribed to your pharmacy.  Please verify your pharmacy hours or chose a 24 hour pharmacy to avoid not having access to your prescription because your pharmacy has closed for the day.    If you are staying overnight following surgery, you will be transported to your hospital room following the recovery period.  [facility] has all private rooms.    If you have any questions please call Pre-Admission Testing at [phone].  Deductibles and co-payments are collected on the day of service. Please be prepared to pay the required co-pay, deductible or deposit on the day of service as defined by your plan.    Call your surgeon immediately if you experience any of the following symptoms:  Sore Throat  Shortness of Breath or difficulty breathing  Cough  Chills  Body soreness or muscle pain  Headache  Fever  New loss of taste or smell  Do not arrive for your surgery ill.  Your procedure will need to be rescheduled to another time.  You will need to call your physician before the day of surgery to avoid any unnecessary exposure to hospital staff as well as other patients.      CHLORHEXIDINE CLOTH INSTRUCTIONS  The morning of surgery follow these instructions using the Chlorhexidine cloths you've been given.  These steps reduce bacteria on the body.  Do not use the cloths near your eyes, ears mouth, genitalia or on open wounds.  Throw the cloths away after use but do not try to flush them down a toilet.      Open and remove one cloth at a time from the package.     Leave the cloth unfolded and begin the bathing.  Massage the skin with the cloths using gentle pressure to remove bacteria.  Do not scrub harshly.   Follow the steps below with one 2% CHG cloth per area (6 total cloths).  One cloth for neck, shoulders and chest.  One cloth for both arms, hands, fingers and underarms (do underarms last).  One cloth for the abdomen followed by groin.  One cloth for right leg and foot including between the toes.  One cloth for left leg and foot including between the toes.  The last cloth is to be used for the back of the neck, back and buttocks.    Allow the CHG to air dry 3 minutes on the skin which will give it time to work and decrease the chance of irritation.  The skin may feel sticky until it is dry.  Do not rinse with water or any other liquid or you will lose the beneficial effects of the CHG.  If mild skin irritation occurs, do rinse the skin to remove the CHG.  Report this to the nurse at time of admission.  Do not apply lotions, creams, ointments, deodorants or perfumes after using the clothes. Dress in clean clothes before coming to the hospital.

## 2025-02-07 ENCOUNTER — ANESTHESIA (OUTPATIENT)
Dept: PERIOP | Facility: HOSPITAL | Age: 55
End: 2025-02-07
Payer: COMMERCIAL

## 2025-02-07 ENCOUNTER — ANESTHESIA EVENT (OUTPATIENT)
Dept: PERIOP | Facility: HOSPITAL | Age: 55
End: 2025-02-07
Payer: COMMERCIAL

## 2025-02-07 ENCOUNTER — HOSPITAL ENCOUNTER (INPATIENT)
Facility: HOSPITAL | Age: 55
LOS: 4 days | Discharge: HOME | DRG: 358 | End: 2025-02-11
Attending: SURGERY | Admitting: SURGERY
Payer: COMMERCIAL

## 2025-02-07 DIAGNOSIS — R16.1 SPLENIC MASS: Primary | ICD-10-CM

## 2025-02-07 PROCEDURE — 25010000002 LIDOCAINE 2% SOLUTION

## 2025-02-07 PROCEDURE — 25010000002 HYDROMORPHONE PER 4 MG: Performed by: FAMILY MEDICINE

## 2025-02-07 PROCEDURE — 38100 REMOVAL OF SPLEEN TOTAL: CPT | Performed by: SURGERY

## 2025-02-07 PROCEDURE — 25010000002 CEFAZOLIN PER 500 MG: Performed by: SURGERY

## 2025-02-07 PROCEDURE — 88305 TISSUE EXAM BY PATHOLOGIST: CPT | Performed by: SURGERY

## 2025-02-07 PROCEDURE — 25010000002 HYDROMORPHONE 1 MG/ML SOLUTION

## 2025-02-07 PROCEDURE — 25010000002 PROPOFOL 10 MG/ML EMULSION

## 2025-02-07 PROCEDURE — 25010000002 HYDROMORPHONE PER 4 MG: Performed by: SURGERY

## 2025-02-07 PROCEDURE — 25010000002 BUPIVACAINE (PF) 0.5 % SOLUTION 30 ML VIAL: Performed by: SURGERY

## 2025-02-07 PROCEDURE — 25010000002 MAGNESIUM SULFATE PER 500 MG OF MAGNESIUM

## 2025-02-07 PROCEDURE — 25010000002 FENTANYL CITRATE (PF) 50 MCG/ML SOLUTION

## 2025-02-07 PROCEDURE — 38100 REMOVAL OF SPLEEN TOTAL: CPT | Performed by: SPECIALIST/TECHNOLOGIST, OTHER

## 2025-02-07 PROCEDURE — 25010000002 DEXAMETHASONE PER 1 MG

## 2025-02-07 PROCEDURE — 25010000002 FENTANYL CITRATE (PF) 50 MCG/ML SOLUTION: Performed by: FAMILY MEDICINE

## 2025-02-07 PROCEDURE — 07TP0ZZ RESECTION OF SPLEEN, OPEN APPROACH: ICD-10-PCS | Performed by: SURGERY

## 2025-02-07 PROCEDURE — 25010000002 BUPIVACAINE LIPOSOME 1.3 % SUSPENSION 20 ML VIAL: Performed by: SURGERY

## 2025-02-07 PROCEDURE — 25010000002 ONDANSETRON PER 1 MG

## 2025-02-07 PROCEDURE — 25810000003 LACTATED RINGERS PER 1000 ML: Performed by: ANESTHESIOLOGY

## 2025-02-07 PROCEDURE — 25010000002 SUGAMMADEX 200 MG/2ML SOLUTION

## 2025-02-07 DEVICE — ARISTA AH ABSORBABLE HEMOSTATIC PARTICLES, 1G BELLOWS CONTAINER
Type: IMPLANTABLE DEVICE | Site: ABDOMEN | Status: FUNCTIONAL
Brand: ARISTA

## 2025-02-07 DEVICE — ENDOPATH ECHELON ENDOSCOPIC LINEAR CUTTER RELOADS, WHITE, 60MM
Type: IMPLANTABLE DEVICE | Site: ABDOMEN | Status: FUNCTIONAL
Brand: ECHELON ENDOPATH

## 2025-02-07 DEVICE — HORIZON TI LG 6 CLIPS/CART
Type: IMPLANTABLE DEVICE | Site: ABDOMEN | Status: FUNCTIONAL
Brand: WECK

## 2025-02-07 DEVICE — HORIZON TI ML 6 CLIPS/CART
Type: IMPLANTABLE DEVICE | Site: ABDOMEN | Status: FUNCTIONAL
Brand: WECK

## 2025-02-07 RX ORDER — HYDROMORPHONE HYDROCHLORIDE 1 MG/ML
0.5 INJECTION, SOLUTION INTRAMUSCULAR; INTRAVENOUS; SUBCUTANEOUS
Status: DISCONTINUED | OUTPATIENT
Start: 2025-02-07 | End: 2025-02-11 | Stop reason: HOSPADM

## 2025-02-07 RX ORDER — LIDOCAINE HYDROCHLORIDE 20 MG/ML
INJECTION, SOLUTION INFILTRATION; PERINEURAL AS NEEDED
Status: DISCONTINUED | OUTPATIENT
Start: 2025-02-07 | End: 2025-02-07 | Stop reason: SURG

## 2025-02-07 RX ORDER — SODIUM CHLORIDE, SODIUM LACTATE, POTASSIUM CHLORIDE, CALCIUM CHLORIDE 600; 310; 30; 20 MG/100ML; MG/100ML; MG/100ML; MG/100ML
9 INJECTION, SOLUTION INTRAVENOUS CONTINUOUS
Status: DISCONTINUED | OUTPATIENT
Start: 2025-02-07 | End: 2025-02-07

## 2025-02-07 RX ORDER — ACETAMINOPHEN 325 MG/1
650 TABLET ORAL EVERY 6 HOURS PRN
Status: DISCONTINUED | OUTPATIENT
Start: 2025-02-07 | End: 2025-02-11 | Stop reason: HOSPADM

## 2025-02-07 RX ORDER — PROMETHAZINE HYDROCHLORIDE 25 MG/1
25 TABLET ORAL ONCE AS NEEDED
Status: DISCONTINUED | OUTPATIENT
Start: 2025-02-07 | End: 2025-02-07 | Stop reason: HOSPADM

## 2025-02-07 RX ORDER — HYDROMORPHONE HYDROCHLORIDE 1 MG/ML
0.5 INJECTION, SOLUTION INTRAMUSCULAR; INTRAVENOUS; SUBCUTANEOUS
Status: DISCONTINUED | OUTPATIENT
Start: 2025-02-07 | End: 2025-02-07 | Stop reason: HOSPADM

## 2025-02-07 RX ORDER — SODIUM CHLORIDE 0.9 % (FLUSH) 0.9 %
10 SYRINGE (ML) INJECTION EVERY 12 HOURS SCHEDULED
Status: DISCONTINUED | OUTPATIENT
Start: 2025-02-07 | End: 2025-02-11 | Stop reason: HOSPADM

## 2025-02-07 RX ORDER — PROMETHAZINE HYDROCHLORIDE 25 MG/1
25 SUPPOSITORY RECTAL ONCE AS NEEDED
Status: DISCONTINUED | OUTPATIENT
Start: 2025-02-07 | End: 2025-02-07 | Stop reason: HOSPADM

## 2025-02-07 RX ORDER — ROCURONIUM BROMIDE 10 MG/ML
INJECTION, SOLUTION INTRAVENOUS AS NEEDED
Status: DISCONTINUED | OUTPATIENT
Start: 2025-02-07 | End: 2025-02-07 | Stop reason: SURG

## 2025-02-07 RX ORDER — LABETALOL HYDROCHLORIDE 5 MG/ML
5 INJECTION, SOLUTION INTRAVENOUS
Status: DISCONTINUED | OUTPATIENT
Start: 2025-02-07 | End: 2025-02-07 | Stop reason: HOSPADM

## 2025-02-07 RX ORDER — MIDAZOLAM HYDROCHLORIDE 1 MG/ML
1 INJECTION, SOLUTION INTRAMUSCULAR; INTRAVENOUS
Status: DISCONTINUED | OUTPATIENT
Start: 2025-02-07 | End: 2025-02-07 | Stop reason: HOSPADM

## 2025-02-07 RX ORDER — FENTANYL CITRATE 50 UG/ML
INJECTION, SOLUTION INTRAMUSCULAR; INTRAVENOUS AS NEEDED
Status: DISCONTINUED | OUTPATIENT
Start: 2025-02-07 | End: 2025-02-07 | Stop reason: SURG

## 2025-02-07 RX ORDER — MAGNESIUM HYDROXIDE 1200 MG/15ML
LIQUID ORAL AS NEEDED
Status: DISCONTINUED | OUTPATIENT
Start: 2025-02-07 | End: 2025-02-07 | Stop reason: HOSPADM

## 2025-02-07 RX ORDER — ATROPINE SULFATE 0.4 MG/ML
0.4 INJECTION, SOLUTION INTRAMUSCULAR; INTRAVENOUS; SUBCUTANEOUS ONCE AS NEEDED
Status: DISCONTINUED | OUTPATIENT
Start: 2025-02-07 | End: 2025-02-07 | Stop reason: HOSPADM

## 2025-02-07 RX ORDER — OXYCODONE AND ACETAMINOPHEN 7.5; 325 MG/1; MG/1
1 TABLET ORAL EVERY 4 HOURS PRN
Status: DISCONTINUED | OUTPATIENT
Start: 2025-02-07 | End: 2025-02-07 | Stop reason: HOSPADM

## 2025-02-07 RX ORDER — HYDROCODONE BITARTRATE AND ACETAMINOPHEN 5; 325 MG/1; MG/1
1 TABLET ORAL ONCE AS NEEDED
Status: DISCONTINUED | OUTPATIENT
Start: 2025-02-07 | End: 2025-02-07 | Stop reason: HOSPADM

## 2025-02-07 RX ORDER — PANTOPRAZOLE SODIUM 40 MG/1
40 TABLET, DELAYED RELEASE ORAL
Status: DISCONTINUED | OUTPATIENT
Start: 2025-02-08 | End: 2025-02-11 | Stop reason: HOSPADM

## 2025-02-07 RX ORDER — SODIUM CHLORIDE 0.9 % (FLUSH) 0.9 %
3-10 SYRINGE (ML) INJECTION AS NEEDED
Status: DISCONTINUED | OUTPATIENT
Start: 2025-02-07 | End: 2025-02-07 | Stop reason: HOSPADM

## 2025-02-07 RX ORDER — PROCHLORPERAZINE EDISYLATE 5 MG/ML
10 INJECTION INTRAMUSCULAR; INTRAVENOUS EVERY 6 HOURS PRN
Status: DISCONTINUED | OUTPATIENT
Start: 2025-02-07 | End: 2025-02-07 | Stop reason: HOSPADM

## 2025-02-07 RX ORDER — SODIUM CHLORIDE 0.9 % (FLUSH) 0.9 %
3 SYRINGE (ML) INJECTION EVERY 12 HOURS SCHEDULED
Status: DISCONTINUED | OUTPATIENT
Start: 2025-02-07 | End: 2025-02-07 | Stop reason: HOSPADM

## 2025-02-07 RX ORDER — DIPHENHYDRAMINE HYDROCHLORIDE 50 MG/ML
12.5 INJECTION INTRAMUSCULAR; INTRAVENOUS
Status: DISCONTINUED | OUTPATIENT
Start: 2025-02-07 | End: 2025-02-07 | Stop reason: HOSPADM

## 2025-02-07 RX ORDER — KETAMINE HCL IN NACL, ISO-OSM 100MG/10ML
SYRINGE (ML) INJECTION AS NEEDED
Status: DISCONTINUED | OUTPATIENT
Start: 2025-02-07 | End: 2025-02-07 | Stop reason: SURG

## 2025-02-07 RX ORDER — FAMOTIDINE 10 MG/ML
20 INJECTION, SOLUTION INTRAVENOUS ONCE
Status: COMPLETED | OUTPATIENT
Start: 2025-02-07 | End: 2025-02-07

## 2025-02-07 RX ORDER — LIDOCAINE HYDROCHLORIDE 10 MG/ML
0.5 INJECTION, SOLUTION INFILTRATION; PERINEURAL ONCE AS NEEDED
Status: DISCONTINUED | OUTPATIENT
Start: 2025-02-07 | End: 2025-02-07 | Stop reason: HOSPADM

## 2025-02-07 RX ORDER — EPHEDRINE SULFATE 50 MG/ML
INJECTION INTRAVENOUS AS NEEDED
Status: DISCONTINUED | OUTPATIENT
Start: 2025-02-07 | End: 2025-02-07 | Stop reason: SURG

## 2025-02-07 RX ORDER — OXYCODONE AND ACETAMINOPHEN 5; 325 MG/1; MG/1
1 TABLET ORAL EVERY 4 HOURS PRN
Status: DISCONTINUED | OUTPATIENT
Start: 2025-02-07 | End: 2025-02-08

## 2025-02-07 RX ORDER — FENTANYL CITRATE 50 UG/ML
50 INJECTION, SOLUTION INTRAMUSCULAR; INTRAVENOUS
Status: DISCONTINUED | OUTPATIENT
Start: 2025-02-07 | End: 2025-02-07 | Stop reason: HOSPADM

## 2025-02-07 RX ORDER — ONDANSETRON 2 MG/ML
4 INJECTION INTRAMUSCULAR; INTRAVENOUS EVERY 6 HOURS PRN
Status: DISCONTINUED | OUTPATIENT
Start: 2025-02-07 | End: 2025-02-11 | Stop reason: HOSPADM

## 2025-02-07 RX ORDER — SODIUM CHLORIDE 9 MG/ML
40 INJECTION, SOLUTION INTRAVENOUS AS NEEDED
Status: DISCONTINUED | OUTPATIENT
Start: 2025-02-07 | End: 2025-02-11 | Stop reason: HOSPADM

## 2025-02-07 RX ORDER — DEXAMETHASONE SODIUM PHOSPHATE 4 MG/ML
INJECTION, SOLUTION INTRA-ARTICULAR; INTRALESIONAL; INTRAMUSCULAR; INTRAVENOUS; SOFT TISSUE AS NEEDED
Status: DISCONTINUED | OUTPATIENT
Start: 2025-02-07 | End: 2025-02-07 | Stop reason: SURG

## 2025-02-07 RX ORDER — FLUMAZENIL 0.1 MG/ML
0.2 INJECTION INTRAVENOUS AS NEEDED
Status: DISCONTINUED | OUTPATIENT
Start: 2025-02-07 | End: 2025-02-07 | Stop reason: HOSPADM

## 2025-02-07 RX ORDER — NALOXONE HCL 0.4 MG/ML
0.4 VIAL (ML) INJECTION
Status: DISCONTINUED | OUTPATIENT
Start: 2025-02-07 | End: 2025-02-11 | Stop reason: HOSPADM

## 2025-02-07 RX ORDER — EPHEDRINE SULFATE 50 MG/ML
5 INJECTION, SOLUTION INTRAVENOUS ONCE AS NEEDED
Status: DISCONTINUED | OUTPATIENT
Start: 2025-02-07 | End: 2025-02-07 | Stop reason: HOSPADM

## 2025-02-07 RX ORDER — NALOXONE HCL 0.4 MG/ML
0.2 VIAL (ML) INJECTION AS NEEDED
Status: DISCONTINUED | OUTPATIENT
Start: 2025-02-07 | End: 2025-02-07 | Stop reason: HOSPADM

## 2025-02-07 RX ORDER — FENTANYL CITRATE 50 UG/ML
50 INJECTION, SOLUTION INTRAMUSCULAR; INTRAVENOUS ONCE AS NEEDED
Status: DISCONTINUED | OUTPATIENT
Start: 2025-02-07 | End: 2025-02-07 | Stop reason: HOSPADM

## 2025-02-07 RX ORDER — SODIUM CHLORIDE 0.9 % (FLUSH) 0.9 %
10 SYRINGE (ML) INJECTION AS NEEDED
Status: DISCONTINUED | OUTPATIENT
Start: 2025-02-07 | End: 2025-02-11 | Stop reason: HOSPADM

## 2025-02-07 RX ORDER — ONDANSETRON 2 MG/ML
INJECTION INTRAMUSCULAR; INTRAVENOUS AS NEEDED
Status: DISCONTINUED | OUTPATIENT
Start: 2025-02-07 | End: 2025-02-07 | Stop reason: SURG

## 2025-02-07 RX ORDER — ONDANSETRON 2 MG/ML
4 INJECTION INTRAMUSCULAR; INTRAVENOUS ONCE AS NEEDED
Status: DISCONTINUED | OUTPATIENT
Start: 2025-02-07 | End: 2025-02-07 | Stop reason: HOSPADM

## 2025-02-07 RX ORDER — MAGNESIUM SULFATE HEPTAHYDRATE 500 MG/ML
INJECTION, SOLUTION INTRAMUSCULAR; INTRAVENOUS AS NEEDED
Status: DISCONTINUED | OUTPATIENT
Start: 2025-02-07 | End: 2025-02-07 | Stop reason: SURG

## 2025-02-07 RX ORDER — DEXTROSE MONOHYDRATE, SODIUM CHLORIDE, AND POTASSIUM CHLORIDE 50; 1.49; 4.5 G/1000ML; G/1000ML; G/1000ML
75 INJECTION, SOLUTION INTRAVENOUS CONTINUOUS
Status: DISCONTINUED | OUTPATIENT
Start: 2025-02-07 | End: 2025-02-09

## 2025-02-07 RX ORDER — IPRATROPIUM BROMIDE AND ALBUTEROL SULFATE 2.5; .5 MG/3ML; MG/3ML
3 SOLUTION RESPIRATORY (INHALATION) ONCE AS NEEDED
Status: DISCONTINUED | OUTPATIENT
Start: 2025-02-07 | End: 2025-02-07 | Stop reason: HOSPADM

## 2025-02-07 RX ORDER — ENOXAPARIN SODIUM 100 MG/ML
40 INJECTION SUBCUTANEOUS DAILY
Status: DISCONTINUED | OUTPATIENT
Start: 2025-02-08 | End: 2025-02-11 | Stop reason: HOSPADM

## 2025-02-07 RX ORDER — HYDRALAZINE HYDROCHLORIDE 20 MG/ML
5 INJECTION INTRAMUSCULAR; INTRAVENOUS
Status: DISCONTINUED | OUTPATIENT
Start: 2025-02-07 | End: 2025-02-07 | Stop reason: HOSPADM

## 2025-02-07 RX ORDER — PROPOFOL 10 MG/ML
VIAL (ML) INTRAVENOUS AS NEEDED
Status: DISCONTINUED | OUTPATIENT
Start: 2025-02-07 | End: 2025-02-07 | Stop reason: SURG

## 2025-02-07 RX ADMIN — ROCURONIUM BROMIDE 20 MG: 10 INJECTION, SOLUTION INTRAVENOUS at 12:49

## 2025-02-07 RX ADMIN — LIDOCAINE HYDROCHLORIDE 80 MG: 20 INJECTION, SOLUTION INFILTRATION; PERINEURAL at 12:12

## 2025-02-07 RX ADMIN — CEFAZOLIN 2000 MG: 2 INJECTION, POWDER, FOR SOLUTION INTRAMUSCULAR; INTRAVENOUS at 11:46

## 2025-02-07 RX ADMIN — SODIUM CHLORIDE, POTASSIUM CHLORIDE, SODIUM LACTATE AND CALCIUM CHLORIDE 9 ML/HR: 600; 310; 30; 20 INJECTION, SOLUTION INTRAVENOUS at 11:11

## 2025-02-07 RX ADMIN — MAGNESIUM SULFATE HEPTAHYDRATE 2 G: 500 INJECTION, SOLUTION INTRAMUSCULAR; INTRAVENOUS at 12:39

## 2025-02-07 RX ADMIN — FENTANYL CITRATE 50 MCG: 50 INJECTION, SOLUTION INTRAMUSCULAR; INTRAVENOUS at 14:36

## 2025-02-07 RX ADMIN — FENTANYL CITRATE 50 MCG: 50 INJECTION, SOLUTION INTRAMUSCULAR; INTRAVENOUS at 14:20

## 2025-02-07 RX ADMIN — HYDROMORPHONE HYDROCHLORIDE 0.5 MG: 1 INJECTION, SOLUTION INTRAMUSCULAR; INTRAVENOUS; SUBCUTANEOUS at 14:28

## 2025-02-07 RX ADMIN — HYDROMORPHONE HYDROCHLORIDE 0.5 MG: 1 INJECTION, SOLUTION INTRAMUSCULAR; INTRAVENOUS; SUBCUTANEOUS at 14:50

## 2025-02-07 RX ADMIN — HYDROMORPHONE HYDROCHLORIDE 0.5 MG: 1 INJECTION, SOLUTION INTRAMUSCULAR; INTRAVENOUS; SUBCUTANEOUS at 15:31

## 2025-02-07 RX ADMIN — HYDROMORPHONE HYDROCHLORIDE 0.5 MG: 1 INJECTION, SOLUTION INTRAMUSCULAR; INTRAVENOUS; SUBCUTANEOUS at 17:48

## 2025-02-07 RX ADMIN — FAMOTIDINE 20 MG: 10 INJECTION INTRAVENOUS at 11:11

## 2025-02-07 RX ADMIN — DEXTROSE MONOHYDRATE, SODIUM CHLORIDE, AND POTASSIUM CHLORIDE 75 ML/HR: 50; 1.49; 4.5 INJECTION, SOLUTION INTRAVENOUS at 16:35

## 2025-02-07 RX ADMIN — OXYCODONE HYDROCHLORIDE AND ACETAMINOPHEN 1 TABLET: 5; 325 TABLET ORAL at 22:10

## 2025-02-07 RX ADMIN — OXYCODONE HYDROCHLORIDE AND ACETAMINOPHEN 1 TABLET: 5; 325 TABLET ORAL at 16:34

## 2025-02-07 RX ADMIN — ONDANSETRON 4 MG: 2 INJECTION INTRAMUSCULAR; INTRAVENOUS at 13:38

## 2025-02-07 RX ADMIN — HYDROMORPHONE HYDROCHLORIDE 0.5 MG: 1 INJECTION, SOLUTION INTRAMUSCULAR; INTRAVENOUS; SUBCUTANEOUS at 14:15

## 2025-02-07 RX ADMIN — SUGAMMADEX 200 MG: 100 INJECTION, SOLUTION INTRAVENOUS at 13:40

## 2025-02-07 RX ADMIN — FENTANYL CITRATE 50 MCG: 50 INJECTION, SOLUTION INTRAMUSCULAR; INTRAVENOUS at 12:12

## 2025-02-07 RX ADMIN — Medication 10 MG: at 13:16

## 2025-02-07 RX ADMIN — ROCURONIUM BROMIDE 50 MG: 10 INJECTION, SOLUTION INTRAVENOUS at 12:12

## 2025-02-07 RX ADMIN — EPHEDRINE SULFATE 5 MG: 50 INJECTION INTRAVENOUS at 12:48

## 2025-02-07 RX ADMIN — HYDROMORPHONE HYDROCHLORIDE 0.5 MG: 1 INJECTION, SOLUTION INTRAMUSCULAR; INTRAVENOUS; SUBCUTANEOUS at 20:35

## 2025-02-07 RX ADMIN — Medication 10 ML: at 20:30

## 2025-02-07 RX ADMIN — HYDROMORPHONE HYDROCHLORIDE 0.5 MG: 1 INJECTION, SOLUTION INTRAMUSCULAR; INTRAVENOUS; SUBCUTANEOUS at 13:43

## 2025-02-07 RX ADMIN — Medication 20 MG: at 12:30

## 2025-02-07 RX ADMIN — PROPOFOL 50 MG: 10 INJECTION, EMULSION INTRAVENOUS at 13:45

## 2025-02-07 RX ADMIN — FENTANYL CITRATE 50 MCG: 50 INJECTION, SOLUTION INTRAMUSCULAR; INTRAVENOUS at 15:48

## 2025-02-07 RX ADMIN — FENTANYL CITRATE 50 MCG: 50 INJECTION, SOLUTION INTRAMUSCULAR; INTRAVENOUS at 15:01

## 2025-02-07 RX ADMIN — ROCURONIUM BROMIDE 10 MG: 10 INJECTION, SOLUTION INTRAVENOUS at 13:05

## 2025-02-07 RX ADMIN — PROPOFOL 200 MG: 10 INJECTION, EMULSION INTRAVENOUS at 12:12

## 2025-02-07 RX ADMIN — DEXAMETHASONE SODIUM PHOSPHATE 6 MG: 4 INJECTION, SOLUTION INTRA-ARTICULAR; INTRALESIONAL; INTRAMUSCULAR; INTRAVENOUS; SOFT TISSUE at 12:25

## 2025-02-07 NOTE — OP NOTE
Operative Note :  MD Memo Barrientos  1970    Procedure Date: 02/07/25    Pre-op Diagnosis:  Splenic mass [R16.1]    Post-Operative Diagnosis:  Splenic mass [R16.1]    Procedure:   Open splenectomy    Surgeon: Radha Paredes MD    Assistant: Lizz Mcgovern CSA (Lizz was responsible for suctioning, retracting, suturing of all surgical incisions, and application of sterile dressings at the completion of the case)    Anesthesia:  General (general endotracheal tube)    Estimated Blood Loss: 100ml    Specimens: Spleen    Complications: None    Indications:  The patient is a 54-year-old gentleman who was referred to see me a couple of weeks ago with an incidentally discovered large splenic mass seen on a CT calcium score scan.  He then underwent CT abdomen/pelvis that showed a complex cystic mass of the spleen of unknown etiology.  I performed a PET scan on him which showed no evidence for hyperactivity and no evidence for lymphoma throughout the neck, chest, abdomen, and pelvis.  I suspect this is a benign entity such as a large hemangioma of the spleen that is highly likely to rupture at some point.  I would recommend proceeding with splenectomy today for definitive tissue diagnosis.  He has been counseled on the risks of the procedure which include but are not limited to bleeding, wound infection, possible need to excise more than just the spleen if there is any local invasion into the greater curvature of the stomach, pancreatic tail, or left kidney.  He received his postsplenectomy vaccines more than 2 weeks ago and has consented to proceed with surgery.    Findings: 4 pound 10 ounce spleen    Description of procedure:  The patient was brought to the operating room and placed on the OR table in supine position.  An endotracheal tube was inserted and general anesthesia induced.  A Iverson catheter was inserted into the urinary bladder using sterile technique.  The abdominal wall was  prepped and draped in a sterile fashion and a surgical timeout completed.  The midline upper abdominal skin was anesthetized using 0.5% Marcaine with epinephrine.  A midline laparotomy incision was made and the subcutaneous fat divided to the level of the muscular fascia using electrocautery.  The fascia was incised in the midline and a large Art wound protector inserted.  The spleen within the left upper quadrant was markedly engorged as expected.  I divided the gastrocolic omentum using the harmonic device beginning at midline and working towards the left upper quadrant, slowly dividing the short gastric vasculature along the way.  A few of the short gastric vessels were oversewn using 3-0 silk figure-of-eight sutures.  Once I had divided the entirety of the gastrocolic omentum along the left side, I slowly swept the transverse colon with attached omentum inferiorly and stomach was reflected superiorly, revealing the contents of the lesser sac.  The pancreas was identified and the splenic artery was visualized along the superior aspect of the pancreas.  The overlying peritoneum was gently  with electrocautery and the splenic artery skeletonized.  I was able to isolate the splenic artery circumferentially and divided it between 0 silk ties, leaving 2 ties on the proximal portion of the splenic artery and 1 tie on the distal portion.  I then turned my attention to the lateral attachments of the spleen and was able to finger fracture my way around the posterior attachments to the diaphragm.  The spleen itself was not adherent to the stomach, pancreas, or left kidney.  I was able to slowly work my way around the lateral and posterior attachments of the spleen until I was able to pinch my fingers around the only remaining vascular structure leading up to the splenic hilum which was the splenic vein.  This was divided with a white load on the [place] stapler.  The spleen at that point was free-floating and  no longer attached anything in particular.  I attempted to remove the spleen through the upper midline incision but found that the spleen itself was too bulky and would not fit out of the incision.  I extended the incision inferiorly down to the level of the umbilicus and then was able to slowly eviscerate the spleen from the abdomen.  It measured 4 pounds 10 ounces and was passed off to pathology in formalin.  The left upper quadrant was packed with lap sponges which were slowly removed from the sub-diaphragmatic space.  There was a small area of venous bleeding from one of the short gastrics which was oversewn using a 3-0 silk figure-of-eight stitch.  There was also a small venous bleeder adjacent to the tail of the pancreas which was oversewn using a 3-0 silk stitch.  At that point, the entire left upper quadrant appeared completely hemostatic with no evidence of bleeding.  1 g of Mark hemostatic powder was sprayed within the left upper quadrant and a 19 [place] DONTAE drain coiled within that space.  It was brought out through a left upper quadrant stab incision and secured at the skin with a 2-0 silk stitch.  The stomach was returned to its usual configuration and the pancreas inspected and appeared to be in good order.  The midline fascia was then closed using running 0 PDS sutures.  The subcutaneous wound was reapproximated with interrupted 3-0 Vicryl deep dermal sutures.  The skin was closed using a running 4-0 Vicryl subcuticular suture and topical Exofin glue.  He was then extubated, his Iverson catheter removed, and he transferred to PACU in stable condition with all counts correct per nursing.    Radha Paredes MD  General, Robotic, and Endoscopic Surgery  [facility]    [address] [place] [address], [address]  [place] [address]  P: [phone]  F: [phone]

## 2025-02-07 NOTE — ANESTHESIA POSTPROCEDURE EVALUATION
"Patient: Memo Chang    Procedure Summary       Date: 02/07/25 Room / Location: [facility] OR [address] [place] [address] MAIN OR    Anesthesia Start: 1205 Anesthesia Stop: 1408    Procedure: Open splenectomy (Abdomen) Diagnosis:       Splenic mass      (Splenic mass [R16.1])    Surgeons: Radha Paredes MD Provider: Joey Terrell MD    Anesthesia Type: general ASA Status: 2            Anesthesia Type: general    Vitals  Vitals Value Taken Time   /90 02/07/25 1530   Temp 36.6 °C (97.8 °F) 02/07/25 1410   Pulse 68 02/07/25 1534   Resp 18 02/07/25 1410   SpO2 94 % 02/07/25 1534   Vitals shown include unfiled device data.        Post Anesthesia Care and Evaluation    Patient location during evaluation: bedside  Patient participation: complete - patient participated  Level of consciousness: awake and alert  Pain management: adequate    Airway patency: patent  Anesthetic complications: No anesthetic complications  PONV Status: controlled  Cardiovascular status: acceptable and hemodynamically stable  Respiratory status: acceptable, spontaneous ventilation and nonlabored ventilation  Hydration status: acceptable    Comments: /90   Pulse 79   Temp 36.6 °C (97.8 °F) (Oral)   Resp 18   Ht 182.9 cm (72\")   SpO2 95%   BMI 23.63 kg/m²       "

## 2025-02-07 NOTE — ANESTHESIA PREPROCEDURE EVALUATION
Anesthesia Evaluation     no history of anesthetic complications:   NPO Solid Status: > 8 hours  NPO Liquid Status: > 2 hours           Airway   Mallampati: I  TM distance: >3 FB  No difficulty expected  Dental - normal exam     Pulmonary    Cardiovascular - normal exam  Exercise tolerance: excellent (>7 METS)    ECG reviewed    (+) hyperlipidemia    ROS comment: ABNORMAL ECG -  Sinus rhythm  Right bundle branch block  Left ventricular hypertrophy  Small ascending aortic aneurysm    Neuro/Psych  (+) headaches  GI/Hepatic/Renal/Endo      Musculoskeletal     Abdominal    Substance History      OB/GYN          Other                          Anesthesia Plan    ASA 2     general     (2 IVs, T&C    Plts 153,000)  intravenous induction     Anesthetic plan, risks, benefits, and alternatives have been provided, discussed and informed consent has been obtained with: patient.    Use of blood products discussed with patient  Consented to blood products.        CODE STATUS:

## 2025-02-07 NOTE — PLAN OF CARE
Goal Outcome Evaluation:  Admitted from PACU without difficulty.  Patient is AOX4. VSS. IS at bedside.  SCD's in place.  IVF infusing.  Pain monitored.  WCTM.

## 2025-02-07 NOTE — ANESTHESIA PROCEDURE NOTES
Airway  Urgency: elective    Date/Time: 2/7/2025 12:16 PM  Airway not difficult    General Information and Staff    Patient location during procedure: OR  Anesthesiologist: Joey Terrell MD  CRNA/CAA: Marion Chopra CRNA    Indications and Patient Condition  Indications for airway management: airway protection    Preoxygenated: yes  MILS maintained throughout  Mask difficulty assessment: 1 - vent by mask    Final Airway Details  Final airway type: endotracheal airway      Successful airway: ETT  Cuffed: yes   Successful intubation technique: direct laryngoscopy  Facilitating devices/methods: intubating stylet  Endotracheal tube insertion site: oral  Blade: Jeffery  Blade size: 4  ETT size (mm): 7.5  Cormack-Lehane Classification: grade I - full view of glottis  Placement verified by: chest auscultation and capnometry   Cuff volume (mL): 7  Measured from: teeth  ETT/EBT  to teeth (cm): 22  Number of attempts at approach: 1  Assessment: lips, teeth, and gum same as pre-op and atraumatic intubation

## 2025-02-08 LAB
ANION GAP SERPL CALCULATED.3IONS-SCNC: 10 MMOL/L (ref 5–15)
BUN SERPL-MCNC: 10 MG/DL (ref 6–20)
BUN/CREAT SERPL: 8.5 (ref 7–25)
CALCIUM SPEC-SCNC: 8.4 MG/DL (ref 8.6–10.5)
CHLORIDE SERPL-SCNC: 99 MMOL/L (ref 98–107)
CO2 SERPL-SCNC: 25 MMOL/L (ref 22–29)
CREAT SERPL-MCNC: 1.17 MG/DL (ref 0.76–1.27)
DEPRECATED RDW RBC AUTO: 42.1 FL (ref 37–54)
EGFRCR SERPLBLD CKD-EPI 2021: 73.6 ML/MIN/1.73
ERYTHROCYTE [DISTWIDTH] IN BLOOD BY AUTOMATED COUNT: 13.1 % (ref 12.3–15.4)
GLUCOSE SERPL-MCNC: 159 MG/DL (ref 65–99)
HCT VFR BLD AUTO: 38.1 % (ref 37.5–51)
HGB BLD-MCNC: 12.4 G/DL (ref 13–17.7)
MCH RBC QN AUTO: 28.4 PG (ref 26.6–33)
MCHC RBC AUTO-ENTMCNC: 32.5 G/DL (ref 31.5–35.7)
MCV RBC AUTO: 87.4 FL (ref 79–97)
PLATELET # BLD AUTO: 185 10*3/MM3 (ref 140–450)
PMV BLD AUTO: 11 FL (ref 6–12)
POTASSIUM SERPL-SCNC: 4.4 MMOL/L (ref 3.5–5.2)
RBC # BLD AUTO: 4.36 10*6/MM3 (ref 4.14–5.8)
SODIUM SERPL-SCNC: 134 MMOL/L (ref 136–145)
WBC NRBC COR # BLD AUTO: 12.63 10*3/MM3 (ref 3.4–10.8)

## 2025-02-08 PROCEDURE — 85027 COMPLETE CBC AUTOMATED: CPT | Performed by: SURGERY

## 2025-02-08 PROCEDURE — 99024 POSTOP FOLLOW-UP VISIT: CPT | Performed by: SURGERY

## 2025-02-08 PROCEDURE — 25010000002 HYDROMORPHONE PER 4 MG: Performed by: SURGERY

## 2025-02-08 PROCEDURE — 80048 BASIC METABOLIC PNL TOTAL CA: CPT | Performed by: SURGERY

## 2025-02-08 PROCEDURE — 25010000002 ENOXAPARIN PER 10 MG: Performed by: SURGERY

## 2025-02-08 RX ORDER — OXYCODONE AND ACETAMINOPHEN 5; 325 MG/1; MG/1
2 TABLET ORAL EVERY 4 HOURS PRN
Status: DISCONTINUED | OUTPATIENT
Start: 2025-02-08 | End: 2025-02-10

## 2025-02-08 RX ORDER — OXYCODONE AND ACETAMINOPHEN 5; 325 MG/1; MG/1
1 TABLET ORAL EVERY 4 HOURS PRN
Status: DISCONTINUED | OUTPATIENT
Start: 2025-02-08 | End: 2025-02-10

## 2025-02-08 RX ADMIN — HYDROMORPHONE HYDROCHLORIDE 0.5 MG: 1 INJECTION, SOLUTION INTRAMUSCULAR; INTRAVENOUS; SUBCUTANEOUS at 18:19

## 2025-02-08 RX ADMIN — OXYCODONE HYDROCHLORIDE AND ACETAMINOPHEN 1 TABLET: 5; 325 TABLET ORAL at 06:12

## 2025-02-08 RX ADMIN — OXYCODONE HYDROCHLORIDE AND ACETAMINOPHEN 1 TABLET: 5; 325 TABLET ORAL at 10:16

## 2025-02-08 RX ADMIN — PANTOPRAZOLE SODIUM 40 MG: 40 TABLET, DELAYED RELEASE ORAL at 05:37

## 2025-02-08 RX ADMIN — HYDROMORPHONE HYDROCHLORIDE 0.5 MG: 1 INJECTION, SOLUTION INTRAMUSCULAR; INTRAVENOUS; SUBCUTANEOUS at 01:43

## 2025-02-08 RX ADMIN — HYDROMORPHONE HYDROCHLORIDE 0.5 MG: 1 INJECTION, SOLUTION INTRAMUSCULAR; INTRAVENOUS; SUBCUTANEOUS at 03:47

## 2025-02-08 RX ADMIN — OXYCODONE AND ACETAMINOPHEN 2 TABLET: 5; 325 TABLET ORAL at 19:02

## 2025-02-08 RX ADMIN — HYDROMORPHONE HYDROCHLORIDE 0.5 MG: 1 INJECTION, SOLUTION INTRAMUSCULAR; INTRAVENOUS; SUBCUTANEOUS at 21:10

## 2025-02-08 RX ADMIN — HYDROMORPHONE HYDROCHLORIDE 0.5 MG: 1 INJECTION, SOLUTION INTRAMUSCULAR; INTRAVENOUS; SUBCUTANEOUS at 05:37

## 2025-02-08 RX ADMIN — DEXTROSE MONOHYDRATE, SODIUM CHLORIDE, AND POTASSIUM CHLORIDE 75 ML/HR: 50; 1.49; 4.5 INJECTION, SOLUTION INTRAVENOUS at 05:36

## 2025-02-08 RX ADMIN — HYDROMORPHONE HYDROCHLORIDE 0.5 MG: 1 INJECTION, SOLUTION INTRAMUSCULAR; INTRAVENOUS; SUBCUTANEOUS at 13:10

## 2025-02-08 RX ADMIN — HYDROMORPHONE HYDROCHLORIDE 0.5 MG: 1 INJECTION, SOLUTION INTRAMUSCULAR; INTRAVENOUS; SUBCUTANEOUS at 07:58

## 2025-02-08 RX ADMIN — Medication 10 ML: at 07:44

## 2025-02-08 RX ADMIN — HYDROMORPHONE HYDROCHLORIDE 0.5 MG: 1 INJECTION, SOLUTION INTRAMUSCULAR; INTRAVENOUS; SUBCUTANEOUS at 15:57

## 2025-02-08 RX ADMIN — OXYCODONE AND ACETAMINOPHEN 2 TABLET: 5; 325 TABLET ORAL at 14:11

## 2025-02-08 RX ADMIN — HYDROMORPHONE HYDROCHLORIDE 0.5 MG: 1 INJECTION, SOLUTION INTRAMUSCULAR; INTRAVENOUS; SUBCUTANEOUS at 10:56

## 2025-02-08 RX ADMIN — ENOXAPARIN SODIUM 40 MG: 100 INJECTION SUBCUTANEOUS at 10:16

## 2025-02-08 RX ADMIN — DEXTROSE MONOHYDRATE, SODIUM CHLORIDE, AND POTASSIUM CHLORIDE 75 ML/HR: 50; 1.49; 4.5 INJECTION, SOLUTION INTRAVENOUS at 19:03

## 2025-02-08 NOTE — PROGRESS NOTES
Colorectal & General Surgery  Progress Note    Patient: Memo Chang  YOB: 1970  MRN: [id number]      Assessment  Memo Chang is a 54 y.o. male with splenic mass is postoperative day 1 from splenectomy.    Afebrile, no tachycardia or hypotension.  Drain is sanguinous but very low volume.  Incision in good order.  Abdomen is benign.  Labs are reassuring.    Pain control is the only real issue this morning.    Subjective  No significant events.  Having significant abdominal pain but was able to get up to the chair.    Objective    Vitals:    02/08/25 0812   BP: 119/78   Pulse: 83   Resp: 16   Temp: 99 °F (37.2 °C)   SpO2: 96%       Physical Exam  Constitutional: Well-developed well-nourished, no acute distress  Neck: Supple, trachea midline  Respiratory: No increased work of breathing, Symmetric excursion  Cardiovascular: Well pefursed, no jugular venous distention evident   Abdominal: Incisions in good order.  Soft, appropriately tender, nondistended.  Drain is sanguinous but very low volume.  Skin: Warm, dry, no rash on visualized skin surfaces  Psychiatric: Alert and oriented ×3, normal affect     Laboratory Results  I have personally reviewed CBC with WBC 12, equal 12, platelets 185.  BMP with creatinine 1.1, potassium 4.4.    Radiology  None to review         Dinh Langford MD  Colorectal & General Surgery  [facility]    [address] [place] [address], [address]  [place], [place], [address]  P: [phone]  F: [phone]

## 2025-02-08 NOTE — PLAN OF CARE
Goal Outcome Evaluation:           Progress: improving  Outcome Evaluation: Patient alert and oriented, on room air, continuous pulse ox utilized, ambulating the room and halls with stand by assistance, voiding per urinal, poor appetite but on regular diet as tolerated, pain manageable with dilaudid and Percocet see pain medication regimen in MAR for details, DONTAE drain to bulb suction, midline incision dressing clean dry intact, D51/2NS20K infusing 100ml/hr per order, vitals stable, plan of care continued D51/2NS20K infusing 75ml/hr per order not 100ml/hr see MAR.

## 2025-02-09 PROCEDURE — 25010000002 ENOXAPARIN PER 10 MG: Performed by: SURGERY

## 2025-02-09 PROCEDURE — 99024 POSTOP FOLLOW-UP VISIT: CPT | Performed by: SURGERY

## 2025-02-09 PROCEDURE — 25010000002 HYDROMORPHONE PER 4 MG: Performed by: SURGERY

## 2025-02-09 RX ORDER — KETOROLAC TROMETHAMINE 30 MG/ML
30 INJECTION, SOLUTION INTRAMUSCULAR; INTRAVENOUS EVERY 6 HOURS PRN
Status: DISCONTINUED | OUTPATIENT
Start: 2025-02-09 | End: 2025-02-10

## 2025-02-09 RX ORDER — METHOCARBAMOL 750 MG/1
750 TABLET, FILM COATED ORAL 4 TIMES DAILY
Status: DISCONTINUED | OUTPATIENT
Start: 2025-02-09 | End: 2025-02-11 | Stop reason: HOSPADM

## 2025-02-09 RX ADMIN — METHOCARBAMOL TABLETS 750 MG: 750 TABLET, COATED ORAL at 17:26

## 2025-02-09 RX ADMIN — Medication 10 ML: at 09:48

## 2025-02-09 RX ADMIN — OXYCODONE AND ACETAMINOPHEN 2 TABLET: 5; 325 TABLET ORAL at 15:59

## 2025-02-09 RX ADMIN — OXYCODONE AND ACETAMINOPHEN 2 TABLET: 5; 325 TABLET ORAL at 20:13

## 2025-02-09 RX ADMIN — HYDROMORPHONE HYDROCHLORIDE 0.5 MG: 1 INJECTION, SOLUTION INTRAMUSCULAR; INTRAVENOUS; SUBCUTANEOUS at 02:26

## 2025-02-09 RX ADMIN — OXYCODONE AND ACETAMINOPHEN 2 TABLET: 5; 325 TABLET ORAL at 11:00

## 2025-02-09 RX ADMIN — METHOCARBAMOL TABLETS 750 MG: 750 TABLET, COATED ORAL at 11:01

## 2025-02-09 RX ADMIN — METHOCARBAMOL TABLETS 750 MG: 750 TABLET, COATED ORAL at 20:13

## 2025-02-09 RX ADMIN — OXYCODONE AND ACETAMINOPHEN 2 TABLET: 5; 325 TABLET ORAL at 00:22

## 2025-02-09 RX ADMIN — PANTOPRAZOLE SODIUM 40 MG: 40 TABLET, DELAYED RELEASE ORAL at 06:51

## 2025-02-09 RX ADMIN — OXYCODONE AND ACETAMINOPHEN 2 TABLET: 5; 325 TABLET ORAL at 04:14

## 2025-02-09 RX ADMIN — DEXTROSE MONOHYDRATE, SODIUM CHLORIDE, AND POTASSIUM CHLORIDE 75 ML/HR: 50; 1.49; 4.5 INJECTION, SOLUTION INTRAVENOUS at 08:00

## 2025-02-09 RX ADMIN — HYDROMORPHONE HYDROCHLORIDE 0.5 MG: 1 INJECTION, SOLUTION INTRAMUSCULAR; INTRAVENOUS; SUBCUTANEOUS at 08:01

## 2025-02-09 RX ADMIN — ENOXAPARIN SODIUM 40 MG: 100 INJECTION SUBCUTANEOUS at 08:01

## 2025-02-09 NOTE — PROGRESS NOTES
Colorectal & General Surgery  Progress Note    Patient: Memo Chang  YOB: 1970  MRN: [id number]      Assessment  Memo Chang is a 54 y.o. male who is postoperative day 2 from splenectomy for splenic mass.    Overall, recovering well.  Pain control as he only thing keeping him in the hospital at this point.  It is improved from yesterday but not quite ready for home.    Drain is serosanguineous and low-volume.  Abdominal exam benign.  Incision in good order.    Plan  Discontinue IV fluids  Anticipate discharge tomorrow      Subjective  No significant events.  Complains of pain in his abdomen.  No bowel function yet.    Objective    Vitals:    02/09/25 0821   BP: 117/75   Pulse: 86   Resp: 20   Temp: 99.1 °F (37.3 °C)   SpO2: 95%       Physical Exam  Constitutional: Well-developed well-nourished, no acute distress  Neck: Supple, trachea midline  Respiratory: No increased work of breathing, Symmetric excursion  Cardiovascular: Well pefursed, no jugular venous distention evident   Abdominal: Incision in good order.  Soft, tender to palpation, non-distended  Skin: Warm, dry, no rash on visualized skin surfaces  Psychiatric: Alert and oriented ×3, normal affect          Dinh Langford MD  Colorectal & General Surgery  [facility]    [address], [address]  [place], [place], [address]  P: [phone]  F: [phone]

## 2025-02-09 NOTE — PLAN OF CARE
Goal Outcome Evaluation:         Patient is alert and oriented x 4, on room air, tolerating his regular diet and c/o pain, See Mar.    Patient has ambulated around the unit with his family.      KALI

## 2025-02-10 LAB
CYTO UR: NORMAL
DX PRELIMINARY: NORMAL
LAB AP CASE REPORT: NORMAL
PATH REPORT.GROSS SPEC: NORMAL

## 2025-02-10 PROCEDURE — 25010000002 ENOXAPARIN PER 10 MG: Performed by: SURGERY

## 2025-02-10 PROCEDURE — 25010000002 KETOROLAC TROMETHAMINE PER 15 MG: Performed by: SURGERY

## 2025-02-10 PROCEDURE — 99024 POSTOP FOLLOW-UP VISIT: CPT | Performed by: SURGERY

## 2025-02-10 RX ORDER — KETOROLAC TROMETHAMINE 30 MG/ML
30 INJECTION, SOLUTION INTRAMUSCULAR; INTRAVENOUS EVERY 6 HOURS
Status: DISCONTINUED | OUTPATIENT
Start: 2025-02-10 | End: 2025-02-11 | Stop reason: HOSPADM

## 2025-02-10 RX ORDER — HYDROCODONE BITARTRATE AND ACETAMINOPHEN 10; 325 MG/1; MG/1
1 TABLET ORAL EVERY 4 HOURS PRN
Status: DISCONTINUED | OUTPATIENT
Start: 2025-02-10 | End: 2025-02-11 | Stop reason: HOSPADM

## 2025-02-10 RX ADMIN — OXYCODONE AND ACETAMINOPHEN 2 TABLET: 5; 325 TABLET ORAL at 06:51

## 2025-02-10 RX ADMIN — KETOROLAC TROMETHAMINE 30 MG: 30 INJECTION, SOLUTION INTRAMUSCULAR at 06:51

## 2025-02-10 RX ADMIN — KETOROLAC TROMETHAMINE 30 MG: 30 INJECTION, SOLUTION INTRAMUSCULAR at 18:53

## 2025-02-10 RX ADMIN — HYDROCODONE BITARTRATE AND ACETAMINOPHEN 1 TABLET: 10; 325 TABLET ORAL at 18:19

## 2025-02-10 RX ADMIN — METHOCARBAMOL TABLETS 750 MG: 750 TABLET, COATED ORAL at 14:28

## 2025-02-10 RX ADMIN — METHOCARBAMOL TABLETS 750 MG: 750 TABLET, COATED ORAL at 20:31

## 2025-02-10 RX ADMIN — Medication 10 ML: at 09:54

## 2025-02-10 RX ADMIN — METHOCARBAMOL TABLETS 750 MG: 750 TABLET, COATED ORAL at 09:54

## 2025-02-10 RX ADMIN — Medication 10 ML: at 01:00

## 2025-02-10 RX ADMIN — Medication 10 ML: at 20:31

## 2025-02-10 RX ADMIN — KETOROLAC TROMETHAMINE 30 MG: 30 INJECTION, SOLUTION INTRAMUSCULAR at 00:59

## 2025-02-10 RX ADMIN — KETOROLAC TROMETHAMINE 30 MG: 30 INJECTION, SOLUTION INTRAMUSCULAR at 12:38

## 2025-02-10 RX ADMIN — OXYCODONE AND ACETAMINOPHEN 2 TABLET: 5; 325 TABLET ORAL at 00:59

## 2025-02-10 RX ADMIN — PANTOPRAZOLE SODIUM 40 MG: 40 TABLET, DELAYED RELEASE ORAL at 06:21

## 2025-02-10 RX ADMIN — ENOXAPARIN SODIUM 40 MG: 100 INJECTION SUBCUTANEOUS at 09:54

## 2025-02-10 NOTE — PLAN OF CARE
Goal Outcome Evaluation:              Outcome Evaluation: VSS. A&Ox4. PRN Toradol switched to scheduled for patient pain control, this seems to work the best for the patient. Encouraging ambulation this shift. Bowel sounds normoactive but no luck with a BM so far. Tolerating a regular diet well this shift. Possible d/c home tomorrow. Plan of care ongoing.

## 2025-02-10 NOTE — PLAN OF CARE
Goal Outcome Evaluation:  Plan of Care Reviewed With: patient        Progress: no change  Outcome Evaluation: Pt A&Ox4, on room air, reports no flatus, no BM, faint bowel sounds, walked around nurses station twice with brother at side, denies nausea presently, percocet 10 prn to control abd pain. Pt had 10 cc drainage from DONTAE drain.

## 2025-02-10 NOTE — PAYOR COMM NOTE
"Memo Chang \"AUSTIN\" (54 y.o. Male)          U/D FOR [id number]      F# [phone]         Date of Birth   1970    Social Security Number       Address   [facility] [address] [place] [address]    Home Phone   [phone]    MRN   [id number]       Druze   None    Marital Status                               Admission Date   2/7/25    Admission Type   Elective    Admitting Provider   Radha Paredes MD    Attending Provider   Radha Paredes MD    Department, Room/Bed   [address], [facility]/1       Discharge Date       Discharge Disposition       Discharge Destination                                 Attending Provider: Radha Paredes MD    Allergies: No Known Allergies    Isolation: None   Infection: None   Code Status: CPR    Ht: 182.9 cm (72\")   Wt: 79 kg (174 lb 2.6 oz)    Admission Cmt: None   Principal Problem: Splenic mass [R16.1]                   Active Insurance as of 2/7/2025       Primary Coverage       Payor Plan Insurance Group Employer/Plan Group    [employer] BLUE CROSS [employer] BLUE CROSS BLUE [employer] PPO [id number]       Payor Plan Address Payor Plan Phone Number Payor Plan Fax Number Effective Dates    [address] [phone]  1/1/2018 - None Entered    [place] [address]         Subscriber Name Subscriber Birth Date Member ID       DANUTAMEMO 1970 [id number]                     Emergency Contacts        (Rel.) Home Phone Work Phone Mobile Phone    Kristin Chang (Spouse) -- -- [phone]                 Operative/Procedure Notes (last 4 days)        Radha Paredes MD at 02/07/25 1148          Operative Note :  MD Memo Barrientos  1970    Procedure Date: 02/07/25    Pre-op Diagnosis:  Splenic mass [R16.1]    Post-Operative Diagnosis:  Splenic mass [R16.1]    Procedure:   Open splenectomy    Surgeon: Radha Paredes MD    Assistant: Lizz Mcgovern CSA (Lizz was responsible for " suctioning, retracting, suturing of all surgical incisions, and application of sterile dressings at the completion of the case)    Anesthesia:  General (general endotracheal tube)    Estimated Blood Loss: 100ml    Specimens: Spleen    Complications: None    Indications:  The patient is a 54-year-old gentleman who was referred to see me a couple of weeks ago with an incidentally discovered large splenic mass seen on a CT calcium score scan.  He then underwent CT abdomen/pelvis that showed a complex cystic mass of the spleen of unknown etiology.  I performed a PET scan on him which showed no evidence for hyperactivity and no evidence for lymphoma throughout the neck, chest, abdomen, and pelvis.  I suspect this is a benign entity such as a large hemangioma of the spleen that is highly likely to rupture at some point.  I would recommend proceeding with splenectomy today for definitive tissue diagnosis.  He has been counseled on the risks of the procedure which include but are not limited to bleeding, wound infection, possible need to excise more than just the spleen if there is any local invasion into the greater curvature of the stomach, pancreatic tail, or left kidney.  He received his postsplenectomy vaccines more than 2 weeks ago and has consented to proceed with surgery.    Findings: 4 pound 10 ounce spleen    Description of procedure:  The patient was brought to the operating room and placed on the OR table in supine position.  An endotracheal tube was inserted and general anesthesia induced.  A Iverson catheter was inserted into the urinary bladder using sterile technique.  The abdominal wall was prepped and draped in a sterile fashion and a surgical timeout completed.  The midline upper abdominal skin was anesthetized using 0.5% Marcaine with epinephrine.  A midline laparotomy incision was made and the subcutaneous fat divided to the level of the muscular fascia using electrocautery.  The fascia was incised in the  midline and a large Art wound protector inserted.  The spleen within the left upper quadrant was markedly engorged as expected.  I divided the gastrocolic omentum using the harmonic device beginning at midline and working towards the left upper quadrant, slowly dividing the short gastric vasculature along the way.  A few of the short gastric vessels were oversewn using 3-0 silk figure-of-eight sutures.  Once I had divided the entirety of the gastrocolic omentum along the left side, I slowly swept the transverse colon with attached omentum inferiorly and stomach was reflected superiorly, revealing the contents of the lesser sac.  The pancreas was identified and the splenic artery was visualized along the superior aspect of the pancreas.  The overlying peritoneum was gently  with electrocautery and the splenic artery skeletonized.  I was able to isolate the splenic artery circumferentially and divided it between 0 silk ties, leaving 2 ties on the proximal portion of the splenic artery and 1 tie on the distal portion.  I then turned my attention to the lateral attachments of the spleen and was able to finger fracture my way around the posterior attachments to the diaphragm.  The spleen itself was not adherent to the stomach, pancreas, or left kidney.  I was able to slowly work my way around the lateral and posterior attachments of the spleen until I was able to pinch my fingers around the only remaining vascular structure leading up to the splenic hilum which was the splenic vein.  This was divided with a white load on the [place] stapler.  The spleen at that point was free-floating and no longer attached anything in particular.  I attempted to remove the spleen through the upper midline incision but found that the spleen itself was too bulky and would not fit out of the incision.  I extended the incision inferiorly down to the level of the umbilicus and then was able to slowly eviscerate the spleen from  the abdomen.  It measured 4 pounds 10 ounces and was passed off to pathology in formalin.  The left upper quadrant was packed with lap sponges which were slowly removed from the sub-diaphragmatic space.  There was a small area of venous bleeding from one of the short gastrics which was oversewn using a 3-0 silk figure-of-eight stitch.  There was also a small venous bleeder adjacent to the tail of the pancreas which was oversewn using a 3-0 silk stitch.  At that point, the entire left upper quadrant appeared completely hemostatic with no evidence of bleeding.  1 g of Mark hemostatic powder was sprayed within the left upper quadrant and a 19 Guyanese DONTAE drain coiled within that space.  It was brought out through a left upper quadrant stab incision and secured at the skin with a 2-0 silk stitch.  The stomach was returned to its usual configuration and the pancreas inspected and appeared to be in good order.  The midline fascia was then closed using running 0 PDS sutures.  The subcutaneous wound was reapproximated with interrupted 3-0 Vicryl deep dermal sutures.  The skin was closed using a running 4-0 Vicryl subcuticular suture and topical Exofin glue.  He was then extubated, his Iverson catheter removed, and he transferred to PACU in stable condition with all counts correct per nursing.    Radha Paredes MD  General, Robotic, and Endoscopic Surgery  [facility]    [address] [place] [address], [address]  [place] [address]  P: [phone]  F: [phone]       Electronically signed by Radha Paredes MD at 02/07/25 1355          Physician Progress Notes (last 72 hours)        Radha Paredes MD at 02/10/25 1219          General Surgery  Progress Note    CC: Follow-up large splenic mass    POD#3 open splenectomy    S: Pain control continues to be an issue.  The addition of Toradol yesterday was largely helpful, but he does not feel as though the Percocet does much good.  He has been walking around the  "nurses station multiple times and denies any nausea or vomiting.  His DONTAE drain output has become negligible.    O:/77   Pulse 79   Temp 98.6 °F (37 °C)   Resp 18   Ht 182.9 cm (72\")   Wt 79 kg (174 lb 2.6 oz)   SpO2 99%   BMI 23.62 kg/m²     Intake & Output:  UOP: 260 mL/24 hrs  DONTAE: 10 mL/24 hrs, serosanguineous    GENERAL: alert, well appearing, and in no distress  HEENT: normocephalic, atraumatic, moist mucous membranes, clear sclerae   CHEST: clear to auscultation, no wheezes, rales or rhonchi, symmetric air entry  CARDIAC: regular rate and rhythm    ABDOMEN: Soft, nondistended, midline incision clean/dry/intact with glue, DONTAE output serosanguineous  EXTREMITIES: no cyanosis, clubbing, or edema   SKIN: Warm and moist, no rashes    LABS  Results from last 7 days   Lab Units 02/08/25  0421 02/05/25  0648   WBC 10*3/mm3 12.63* 5.93   HEMOGLOBIN g/dL 12.4* 11.9*   HEMATOCRIT % 38.1 35.7*   PLATELETS 10*3/mm3 185 153     Results from last 7 days   Lab Units 02/08/25  0422 02/05/25  0648   SODIUM mmol/L 134* 140   POTASSIUM mmol/L 4.4 4.1   CHLORIDE mmol/L 99 104   CO2 mmol/L 25.0 23.2   BUN mg/dL 10 15   CREATININE mg/dL 1.17 1.32*   CALCIUM mg/dL 8.4* 8.7   GLUCOSE mg/dL 159* 115*         A/P: 54 y.o. male POD#3 open splenectomy    Discontinue DONTAE drain  Change Toradol to be scheduled  Discontinue Percocet, begin Norco 10/325  Possibly home tomorrow  Follow-up pathology results    Radha Paredes MD  General, Robotic, and Endoscopic Surgery  [facility]    [address] [place] [address], [address]  [place] [address]  P: [phone]  F: [phone]       Electronically signed by Radha Paredes MD at 02/10/25 1221       Naga Langford MD at 02/09/25 1042          Colorectal & General Surgery  Progress Note    Patient: Memo Chang  YOB: 1970  MRN: [id number]      Assessment  Memo Chang is a 54 y.o. male who is postoperative day 2 from splenectomy for splenic " mass.    Overall, recovering well.  Pain control as he only thing keeping him in the hospital at this point.  It is improved from yesterday but not quite ready for home.    Drain is serosanguineous and low-volume.  Abdominal exam benign.  Incision in good order.    Plan  Discontinue IV fluids  Anticipate discharge tomorrow      Subjective  No significant events.  Complains of pain in his abdomen.  No bowel function yet.    Objective    Vitals:    02/09/25 0821   BP: 117/75   Pulse: 86   Resp: 20   Temp: 99.1 °F (37.3 °C)   SpO2: 95%       Physical Exam  Constitutional: Well-developed well-nourished, no acute distress  Neck: Supple, trachea midline  Respiratory: No increased work of breathing, Symmetric excursion  Cardiovascular: Well pefursed, no jugular venous distention evident   Abdominal: Incision in good order.  Soft, tender to palpation, non-distended  Skin: Warm, dry, no rash on visualized skin surfaces  Psychiatric: Alert and oriented ×3, normal affect          Dinh Langford MD  Colorectal & General Surgery  [facility] [address], [address]  [place], [place], [facility]  P: [phone]  F: [phone]       Electronically signed by Naga Langford MD at 02/09/25 1043       Naga Langford MD at 02/08/25 1114          Colorectal & General Surgery  Progress Note    Patient: Memo Chang  YOB: 1970  MRN: [id number]      Assessment  Memo Chang is a 54 y.o. male with splenic mass is postoperative day 1 from splenectomy.    Afebrile, no tachycardia or hypotension.  Drain is sanguinous but very low volume.  Incision in good order.  Abdomen is benign.  Labs are reassuring.    Pain control is the only real issue this morning.    Subjective  No significant events.  Having significant abdominal pain but was able to get up to the chair.    Objective    Vitals:    02/08/25 0812   BP: 119/78   Pulse: 83   Resp: 16   Temp: 99 °F (37.2 °C)   SpO2: 96%        Physical Exam  Constitutional: Well-developed well-nourished, no acute distress  Neck: Supple, trachea midline  Respiratory: No increased work of breathing, Symmetric excursion  Cardiovascular: Well pefursed, no jugular venous distention evident   Abdominal: Incisions in good order.  Soft, appropriately tender, nondistended.  Drain is sanguinous but very low volume.  Skin: Warm, dry, no rash on visualized skin surfaces  Psychiatric: Alert and oriented ×3, normal affect     Laboratory Results  I have personally reviewed CBC with WBC 12, equal 12, platelets 185.  BMP with creatinine 1.1, potassium 4.4.    Radiology  None to review         Dinh Langford MD  Colorectal & General Surgery  [facility]    [address], [address]  [place], [place], [address]  P: [phone]  F: [phone]       Electronically signed by Naga Langford MD at 02/08/25 1114       Consult Notes (last 72 hours)  Notes from 02/07/25 1404 through 02/10/25 1404   No notes of this type exist for this encounter.

## 2025-02-10 NOTE — PROGRESS NOTES
"General Surgery  Progress Note    CC: Follow-up large splenic mass    POD#3 open splenectomy    S: Pain control continues to be an issue.  The addition of Toradol yesterday was largely helpful, but he does not feel as though the Percocet does much good.  He has been walking around the nurses station multiple times and denies any nausea or vomiting.  His DONTAE drain output has become negligible.    O:/77   Pulse 79   Temp 98.6 °F (37 °C)   Resp 18   Ht 182.9 cm (72\")   Wt 79 kg (174 lb 2.6 oz)   SpO2 99%   BMI 23.62 kg/m²     Intake & Output:  UOP: 260 mL/24 hrs  DONTAE: 10 mL/24 hrs, serosanguineous    GENERAL: alert, well appearing, and in no distress  HEENT: normocephalic, atraumatic, moist mucous membranes, clear sclerae   CHEST: clear to auscultation, no wheezes, rales or rhonchi, symmetric air entry  CARDIAC: regular rate and rhythm    ABDOMEN: Soft, nondistended, midline incision clean/dry/intact with glue, DONTAE output serosanguineous  EXTREMITIES: no cyanosis, clubbing, or edema   SKIN: Warm and moist, no rashes    LABS  Results from last 7 days   Lab Units 02/08/25  0421 02/05/25  0648   WBC 10*3/mm3 12.63* 5.93   HEMOGLOBIN g/dL 12.4* 11.9*   HEMATOCRIT % 38.1 35.7*   PLATELETS 10*3/mm3 185 153     Results from last 7 days   Lab Units 02/08/25  0422 02/05/25  0648   SODIUM mmol/L 134* 140   POTASSIUM mmol/L 4.4 4.1   CHLORIDE mmol/L 99 104   CO2 mmol/L 25.0 23.2   BUN mg/dL 10 15   CREATININE mg/dL 1.17 1.32*   CALCIUM mg/dL 8.4* 8.7   GLUCOSE mg/dL 159* 115*         A/P: 54 y.o. male POD#3 open splenectomy    Discontinue DONTAE drain  Change Toradol to be scheduled  Discontinue Percocet, begin Norco 10/325  Possibly home tomorrow  Follow-up pathology results    Radha Paredes MD  General, Robotic, and Endoscopic Surgery  [facility]    [address], [address]  [place] [address]  P: [phone]  F: [phone]     "

## 2025-02-11 ENCOUNTER — READMISSION MANAGEMENT (OUTPATIENT)
Dept: CALL CENTER | Facility: HOSPITAL | Age: 55
End: 2025-02-11
Payer: COMMERCIAL

## 2025-02-11 VITALS
BODY MASS INDEX: 23.59 KG/M2 | WEIGHT: 174.16 LBS | TEMPERATURE: 98.2 F | DIASTOLIC BLOOD PRESSURE: 80 MMHG | HEART RATE: 84 BPM | OXYGEN SATURATION: 96 % | HEIGHT: 72 IN | SYSTOLIC BLOOD PRESSURE: 114 MMHG | RESPIRATION RATE: 18 BRPM

## 2025-02-11 PROCEDURE — 25010000002 KETOROLAC TROMETHAMINE PER 15 MG: Performed by: SURGERY

## 2025-02-11 PROCEDURE — 99024 POSTOP FOLLOW-UP VISIT: CPT | Performed by: SURGERY

## 2025-02-11 PROCEDURE — 25010000002 ENOXAPARIN PER 10 MG: Performed by: SURGERY

## 2025-02-11 RX ORDER — HYDROCODONE BITARTRATE AND ACETAMINOPHEN 10; 325 MG/1; MG/1
1 TABLET ORAL EVERY 4 HOURS PRN
Qty: 20 TABLET | Refills: 0 | Status: SHIPPED | OUTPATIENT
Start: 2025-02-11 | End: 2025-02-27

## 2025-02-11 RX ORDER — IBUPROFEN 800 MG/1
800 TABLET, FILM COATED ORAL EVERY 8 HOURS PRN
Qty: 20 TABLET | Refills: 2 | Status: SHIPPED | OUTPATIENT
Start: 2025-02-11

## 2025-02-11 RX ADMIN — HYDROCODONE BITARTRATE AND ACETAMINOPHEN 1 TABLET: 10; 325 TABLET ORAL at 10:28

## 2025-02-11 RX ADMIN — ENOXAPARIN SODIUM 40 MG: 100 INJECTION SUBCUTANEOUS at 08:40

## 2025-02-11 RX ADMIN — Medication 10 ML: at 08:40

## 2025-02-11 RX ADMIN — KETOROLAC TROMETHAMINE 30 MG: 30 INJECTION, SOLUTION INTRAMUSCULAR at 06:51

## 2025-02-11 RX ADMIN — KETOROLAC TROMETHAMINE 30 MG: 30 INJECTION, SOLUTION INTRAMUSCULAR at 00:51

## 2025-02-11 RX ADMIN — METHOCARBAMOL TABLETS 750 MG: 750 TABLET, COATED ORAL at 10:28

## 2025-02-11 RX ADMIN — KETOROLAC TROMETHAMINE 30 MG: 30 INJECTION, SOLUTION INTRAMUSCULAR at 13:00

## 2025-02-11 RX ADMIN — PANTOPRAZOLE SODIUM 40 MG: 40 TABLET, DELAYED RELEASE ORAL at 06:51

## 2025-02-11 NOTE — CASE MANAGEMENT/SOCIAL WORK
Case Management Discharge Note      Final Note: Home with family to transport.         Selected Continued Care - Admitted Since 2/7/2025       Destination    No services have been selected for the patient.                Durable Medical Equipment    No services have been selected for the patient.                Dialysis/Infusion    No services have been selected for the patient.                Home Medical Care    No services have been selected for the patient.                Therapy    No services have been selected for the patient.                Community Resources    No services have been selected for the patient.                Community & DME    No services have been selected for the patient.                         Final Discharge Disposition Code: 01 - home or self-care

## 2025-02-11 NOTE — DISCHARGE SUMMARY
Discharge Summary    Patient name: Memo Chang    Medical record number: [id number]    Admission date: 2/7/2025  Discharge date: 2/11/2025     Attending physician: Radha Paredes MD    Primary care physician: Michael Izquierdo MD    Consulting physician(s): None    Condition on discharge: improving    Admitting diagnosis: Splenic mass    Final diagnosis: Same    Procedures: Open splenectomy    History of present illness: The patient is a  54 y.o. male that was admitted to the hospital with an incidentally discovered large hypervascular splenic mass seen on a CT chest for cardiac risk stratification.  The incidentally discovered mass was rechecked with a CT abdomen/pelvis that showed a large hypervascular mass of the spleen of unknown etiology.  A PET/CT was then undertaken and showed no evidence for hyperactivity within the spleen and no evidence for lymphadenopathy elsewhere to suggest an underlying lymphoma.  I proposed scheduling him for an open splenectomy for tissue diagnosis.  He received his postsplenectomy vaccines 2 weeks prior.    Hospital course: He was brought to the hospital for a planned open splenectomy which was achieved without difficulty.  The spleen was passed off to pathology and at the time of discharge the tissue has been sent to [facility] for outside pathology consultation.  Postoperatively he was admitted to [facility] floor for observation with pain control issues initially.  On the date of discharge, his pain is well-controlled with Norco 10/325 mg every 4 hours and supplemental Toradol used for breakthrough pain.  He is being discharged home with ibuprofen 800 mg to help treat his breakthrough pain.    Discharge medications:      Discharge Medications        New Medications        Instructions Start Date   HYDROcodone-acetaminophen  MG per tablet  Commonly known as: NORCO   1 tablet, Oral, Every 4 Hours PRN      ibuprofen 800 MG tablet  Commonly known as: ADVIL,MOTRIN    800 mg, Oral, Every 8 Hours PRN             Continue These Medications        Instructions Start Date   aspirin 81 MG EC tablet   81 mg, Daily PRN      MAGNESIUM PO   1 tablet, Weekly      rimegepant sulfate ODT 75 MG tablet  Commonly known as: NURTEC   75 mg, Daily PRN      vitamin B-12 100 MCG tablet  Commonly known as: CYANOCOBALAMIN   50 mcg, Weekly               Discharge instructions:    - Resume regular diet as tolerated.  - No lifting anything heavier than 15 pounds for 6 weeks postop.  You can resume all other activities as tolerated once your pain level allows.  - No driving while taking narcotic pain medications.  - You may resume showering, no tub bathing for two weeks while your incisions heal.  - Wash your incision with soap and water daily in the shower, pat dry, and leave open to air.    Follow-up appointment: Follow up with Radha Paredes MD in the office in 2 weeks. Call for appointment at [phone].    CODE STATUS: Full code

## 2025-02-11 NOTE — PAYOR COMM NOTE
"Memo Chang \"AUSTIN\" (54 y.o. Male)          DC SUMMARY FOR [id number]     CONTACT UR F# [phone]         Date of Birth   1970    Social Security Number       Address   [facility] [address] [place] [address]    Home Phone   [phone]    MRN   [id number]       Lutheran   None    Marital Status                               Admission Date   2/7/25    Admission Type   Elective    Admitting Provider   Radha Paredes MD    Attending Provider       Department, Room/Bed   [facility] [address] [place], P377/1       Discharge Date   2/11/2025    Discharge Disposition   Home or Self Care    Discharge Destination                                 Attending Provider: (none)   Allergies: No Known Allergies    Isolation: None   Infection: None   Code Status: CPR    Ht: 182.9 cm (72\")   Wt: 79 kg (174 lb 2.6 oz)    Admission Cmt: None   Principal Problem: Splenic mass [R16.1]                   Active Insurance as of 2/7/2025       Primary Coverage       Payor Plan Insurance Group Employer/Plan Group    [employer] BLUE CROSS [facility] [employer] BLUE [facility] PPO [id number]       Payor Plan Address Payor Plan Phone Number Payor Plan Fax Number Effective Dates    [address] [phone]  1/1/2018 - None Entered    [place] [address]         Subscriber Name Subscriber Birth Date Member ID       MEMO CHANG 1970 [id number]                     Emergency Contacts        (Rel.) Home Phone Work Phone Mobile Phone    Kristin Chang (Spouse) -- -- [phone]                 Discharge Summary        Radha Paredes MD at 02/11/25 1234          Discharge Summary    Patient name: Memo Chang    Medical record number: [id number]    Admission date: 2/7/2025  Discharge date: 2/11/2025     Attending physician: Radha Paredes MD    Primary care physician: Michael Izquierdo MD    Consulting physician(s): None    Condition on discharge: improving    Admitting diagnosis: Splenic " mass    Final diagnosis: Same    Procedures: Open splenectomy    History of present illness: The patient is a  54 y.o. male that was admitted to the hospital with an incidentally discovered large hypervascular splenic mass seen on a CT chest for cardiac risk stratification.  The incidentally discovered mass was rechecked with a CT abdomen/pelvis that showed a large hypervascular mass of the spleen of unknown etiology.  A PET/CT was then undertaken and showed no evidence for hyperactivity within the spleen and no evidence for lymphadenopathy elsewhere to suggest an underlying lymphoma.  I proposed scheduling him for an open splenectomy for tissue diagnosis.  He received his postsplenectomy vaccines 2 weeks prior.    Hospital course: He was brought to the hospital for a planned open splenectomy which was achieved without difficulty.  The spleen was passed off to pathology and at the time of discharge the tissue has been sent to [facility] for outside pathology consultation.  Postoperatively he was admitted to [facility] floor for observation with pain control issues initially.  On the date of discharge, his pain is well-controlled with Norco 10/325 mg every 4 hours and supplemental Toradol used for breakthrough pain.  He is being discharged home with ibuprofen 800 mg to help treat his breakthrough pain.    Discharge medications:      Discharge Medications        New Medications        Instructions Start Date   HYDROcodone-acetaminophen  MG per tablet  Commonly known as: NORCO   1 tablet, Oral, Every 4 Hours PRN      ibuprofen 800 MG tablet  Commonly known as: ADVIL,MOTRIN   800 mg, Oral, Every 8 Hours PRN             Continue These Medications        Instructions Start Date   aspirin 81 MG EC tablet   81 mg, Daily PRN      MAGNESIUM PO   1 tablet, Weekly      rimegepant sulfate ODT 75 MG tablet  Commonly known as: NURTEC   75 mg, Daily PRN      vitamin B-12 100 MCG tablet  Commonly known as:  CYANOCOBALAMIN   50 mcg, Weekly               Discharge instructions:    - Resume regular diet as tolerated.  - No lifting anything heavier than 15 pounds for 6 weeks postop.  You can resume all other activities as tolerated once your pain level allows.  - No driving while taking narcotic pain medications.  - You may resume showering, no tub bathing for two weeks while your incisions heal.  - Wash your incision with soap and water daily in the shower, pat dry, and leave open to air.    Follow-up appointment: Follow up with Radha Paredes MD in the office in 2 weeks. Call for appointment at [phone].    CODE STATUS: Full code      Electronically signed by Radha Paredes MD at 02/11/25 2994

## 2025-02-11 NOTE — OUTREACH NOTE
Prep Survey      Flowsheet Row Responses   [facility] patient discharged from? [place]   Is LACE score < 7 ? No   Eligibility [facility]   Date of Admission 02/07/25   Date of Discharge 02/11/25   Discharge Disposition Home or Self Care   Discharge diagnosis Splenic mass, Open splenectomy   Does the patient have one of the following disease processes/diagnoses(primary or secondary)? General Surgery   Does the patient have Home health ordered? No   Is there a DME ordered? No   Prep survey completed? Yes            Mirtha HEATON - Registered Nurse

## 2025-02-11 NOTE — PLAN OF CARE
Goal Outcome Evaluation:  Plan of Care Reviewed With: patient        Progress: improving  Outcome Evaluation: Pt states pain controlled well toradol, passing flatus, no nausea, VSS, midline incision well approximated and clean.

## 2025-02-12 ENCOUNTER — TRANSITIONAL CARE MANAGEMENT TELEPHONE ENCOUNTER (OUTPATIENT)
Dept: CALL CENTER | Facility: HOSPITAL | Age: 55
End: 2025-02-12
Payer: COMMERCIAL

## 2025-02-12 NOTE — OUTREACH NOTE
Call Center TCM Note      Flowsheet Row Responses   [facility] patient discharged from? [place]   Does the patient have one of the following disease processes/diagnoses(primary or secondary)? General Surgery   TCM attempt successful? Yes   Call start time 1427   Call end time 1429   Discharge diagnosis Splenic mass, Open splenectomy   Meds reviewed with patient/caregiver? Yes   Is the patient having any side effects they believe may be caused by any medication additions or changes? No   Does the patient have all medications related to this admission filled (includes all antibiotics, pain medications, etc.) Yes   Prescription comments New rx's ibuprofen, Hydrocodone-acet in place   Is the patient taking all medications as directed (includes completed medication regime)? Yes   Does the patient have an appointment with their PCP within 7-14 days of discharge? No   Nursing Interventions Patient desires to follow up with specialty only, Routed TCM call to PCP office, Patient declined scheduling/rescheduling appointment at this time   Has home health visited the patient within 72 hours of discharge? N/A   Psychosocial issues? No   Did the patient receive a copy of their discharge instructions? Yes   Nursing interventions Reviewed instructions with patient   What is the patient's perception of their health status since discharge? Improving   Nursing interventions Nurse provided patient education   Is the patient /caregiver able to teach back basic post-op care? Continue use of incentive spirometry at least 1 week post discharge, Take showers only when approved by MD-sponge bathe until then, Do not remove steri-strips, Lifting as instructed by MD in discharge instructions, No tub bath, swimming, or hot tub until instructed by MD, Practice 'cough and deep breath', Drive as instructed by MD in discharge instructions, Keep incision areas clean,dry and protected   Is the patient/caregiver able to teach back signs and  symptoms of incisional infection? Increased redness, swelling or pain at the incisonal site, Pus or odor from incision, Fever, Increased drainage or bleeding, Incisional warmth   Is the patient/caregiver able to teach back steps to recovery at home? Set small, achievable goals for return to baseline health, Practice good oral hygiene, Eat a well-balance diet, Rest and rebuild strength, gradually increase activity, Weigh daily, Make a list of questions for surgeon's appointment   If the patient is a current smoker, are they able to teach back resources for cessation? Not a smoker   Is the patient/caregiver able to teach back the hierarchy of who to call/visit for symptoms/problems? PCP, Specialist, Home health nurse, Urgent Care, ED, 911 Yes   TCM call completed? 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 Declines TCM APPT with PCP Dr Izquierdo. First POST OP appt is 02/27/2025.   Call end time [id number]            Hayde Marie MA    2/12/2025, 14:30 EST

## 2025-02-21 ENCOUNTER — TELEPHONE (OUTPATIENT)
Dept: SURGERY | Facility: CLINIC | Age: 55
End: 2025-02-21
Payer: COMMERCIAL

## 2025-02-21 ENCOUNTER — READMISSION MANAGEMENT (OUTPATIENT)
Dept: CALL CENTER | Facility: HOSPITAL | Age: 55
End: 2025-02-21
Payer: COMMERCIAL

## 2025-02-21 LAB
CYTO UR: NORMAL
DX PRELIMINARY: NORMAL
LAB AP CASE REPORT: NORMAL
PATH REPORT.FINAL DX SPEC: NORMAL
PATH REPORT.GROSS SPEC: NORMAL

## 2025-02-21 NOTE — OUTREACH NOTE
General Surgery Week 2 Survey      Flowsheet Row Responses   [facility] patient discharged from? [place]   Does the patient have one of the following disease processes/diagnoses(primary or secondary)? General Surgery   Week 2 attempt successful? Yes   Call start time 1410   Call end time 1411   Discharge diagnosis Splenic mass, Open splenectomy   Person spoke with today (if not patient) and relationship patient   Does the patient have a follow up appointment scheduled with their surgeon? Yes   Comments 02/27 with surgeon   Has home health visited the patient within 72 hours of discharge? N/A   Psychosocial issues? No   Did the patient receive a copy of their discharge instructions? Yes   Nursing interventions Reviewed instructions with patient   What is the patient's perception of their health status since discharge? Improving   Is the patient/caregiver able to teach back signs and symptoms of incisional infection? Increased redness, swelling or pain at the incisonal site, Pus or odor from incision, Fever, Increased drainage or bleeding, Incisional warmth   Is the patient/caregiver able to teach back the hierarchy of who to call/visit for symptoms/problems? PCP, Specialist, Home health nurse, Urgent Care, ED, 911 Yes   Week 2 call completed? Yes   Graduated Yes   Wrap up additional comments Patient reports doing well. No concerns or questions noted.   Call end time 1411            Rachna BEEBE - Registered Nurse

## 2025-02-21 NOTE — TELEPHONE ENCOUNTER
Patient would like to discuss results from sx on 02/07. He is aware that the full results are not in yet

## 2025-02-21 NOTE — TELEPHONE ENCOUNTER
I called Moises back and updated him that we have not received any final pathology results from the send out analysis being done at [facility].  The only results we have thus far are the preliminary results that he and I had discussed prior to his discharge from the hospital.  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  We are not sure yet if this is a benign lesion such as a large hemangioma or a malignant lesion such as a lymphoma.  I called our pathology department who had spoken with [facility] and been updated today that they are still doing further stains on the tissue to determine the etiology of his splenic mass and do not yet have an ETA regarding final report.  As soon as I get the full report from [facility] pathology department I will call him back to update him.  He expressed understanding.

## 2025-02-26 NOTE — PROGRESS NOTES
CHIEF COMPLAINT:   Chief Complaint   Patient presents with    Post-op     Open splenectomy 2/7/25       HISTORY OF PRESENT ILLNESS:  This is a 54 y.o. male who presents for a post-operative visit after undergoing open splenectomy for large splenic mass on 2/7/2025.  He has been doing very well since surgery with no significant fever, chills, or hypotension.  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  He is still struggling with early satiety, and cannot eat a full meal without feeling some left upper quadrant discomfort.  As a result, his energy level is still rather low and he has not yet returned to running.    Pathology:   1. Spleen, splenectomy: Hemangioma.     PHYSICAL EXAM:  Lungs: Clear  Heart: RRR  ABD: Midline incision is healing well without erythema or fluctuance  Ext: no significant edema, calves nontender  BMI is within normal parameters. No other follow-up for BMI required.    A/P:  This is a 54 y.o. male patient who is S/P open splenectomy on 2/7/2025    He is healing very well.  I reassured him that his early satiety should improve over the next 1 to 2 months.  I would like to see him back in 1 month's time to recheck his incision and make sure his energy and stamina as well as appetite are improving.  He knows to continue to avoid any heavy lifting beyond 15 pounds for at least 3-5 more weeks.  He received his first round of postsplenectomy vaccines 2 weeks preop and is scheduled to receive his second dose of the manage coccal vaccines on 3/19 at [facility].  I advised him he will then need the Streptococcus pneumoniae vaccine every 5 years which can be done through his primary care provider.    Radha Paredes MD  General, Robotic, and Endoscopic Surgery  [facility]    [address], [address]  [place] [address]  P: [phone]  F: [phone]

## 2025-02-27 ENCOUNTER — OFFICE VISIT (OUTPATIENT)
Dept: SURGERY | Facility: CLINIC | Age: 55
End: 2025-02-27
Payer: COMMERCIAL

## 2025-02-27 VITALS
BODY MASS INDEX: 21.97 KG/M2 | WEIGHT: 162.2 LBS | OXYGEN SATURATION: 98 % | DIASTOLIC BLOOD PRESSURE: 82 MMHG | HEIGHT: 72 IN | HEART RATE: 66 BPM | SYSTOLIC BLOOD PRESSURE: 130 MMHG

## 2025-02-27 DIAGNOSIS — Z09 SURGICAL FOLLOWUP: Primary | ICD-10-CM

## 2025-02-27 PROCEDURE — 99024 POSTOP FOLLOW-UP VISIT: CPT | Performed by: SURGERY

## 2025-03-19 ENCOUNTER — HOSPITAL ENCOUNTER (OUTPATIENT)
Dept: INFUSION THERAPY | Facility: HOSPITAL | Age: 55
Discharge: HOME | End: 2025-03-19
Admitting: SURGERY
Payer: COMMERCIAL

## 2025-03-19 VITALS
TEMPERATURE: 97 F | DIASTOLIC BLOOD PRESSURE: 79 MMHG | RESPIRATION RATE: 18 BRPM | SYSTOLIC BLOOD PRESSURE: 151 MMHG | OXYGEN SATURATION: 100 % | HEART RATE: 66 BPM

## 2025-03-19 DIAGNOSIS — Z90.81 H/O SPLENECTOMY: Primary | ICD-10-CM

## 2025-03-19 PROCEDURE — 90471 IMMUNIZATION ADMIN: CPT

## 2025-03-19 PROCEDURE — 25010000002 MENINGOCOCCAL RECONSTITUTED SOLUTION: Performed by: SURGERY

## 2025-03-19 PROCEDURE — 90472 IMMUNIZATION ADMIN EACH ADD: CPT | Performed by: SURGERY

## 2025-03-19 PROCEDURE — 90734 MENACWYD/MENACWYCRM VACC IM: CPT | Performed by: SURGERY

## 2025-03-19 PROCEDURE — 25010000002 MENINGOCOCCAL B SUSPENSION PREFILLED SYRINGE: Performed by: SURGERY

## 2025-03-19 PROCEDURE — 90471 IMMUNIZATION ADMIN: CPT | Performed by: SURGERY

## 2025-03-19 PROCEDURE — 90620 MENB-4C VACCINE IM: CPT | Performed by: SURGERY

## 2025-03-19 RX ADMIN — MENINGOCOCCAL CYW-135 LIQUID DILUENT 0.5 ML: RECON SOLN at 09:35

## 2025-03-19 RX ADMIN — NEISSERIA MENINGITIDIS SEROGROUP B NHBA FUSION PROTEIN ANTIGEN, NEISSERIA MENINGITIDIS SEROGROUP B FHBP FUSION PROTEIN ANTIGEN AND NEISSERIA MENINGITIDIS SEROGROUP B NADA PROTEIN ANTIGEN 0.5 ML: 50; 50; 50; 25 INJECTION, SUSPENSION INTRAMUSCULAR at 09:37

## 2025-03-27 ENCOUNTER — OFFICE VISIT (OUTPATIENT)
Dept: SURGERY | Facility: CLINIC | Age: 55
End: 2025-03-27
Payer: COMMERCIAL

## 2025-03-27 VITALS
HEART RATE: 66 BPM | DIASTOLIC BLOOD PRESSURE: 78 MMHG | BODY MASS INDEX: 22.13 KG/M2 | WEIGHT: 163.4 LBS | OXYGEN SATURATION: 96 % | HEIGHT: 72 IN | SYSTOLIC BLOOD PRESSURE: 132 MMHG

## 2025-03-27 DIAGNOSIS — Z09 SURGICAL FOLLOWUP: Primary | ICD-10-CM

## 2025-03-27 PROCEDURE — 99024 POSTOP FOLLOW-UP VISIT: CPT | Performed by: SURGERY

## 2025-03-27 NOTE — PROGRESS NOTES
CHIEF COMPLAINT:   Chief Complaint   Patient presents with    Post-op Follow-up       HISTORY OF PRESENT ILLNESS:  This is a 54 y.o. male who presents for a post-operative visit after undergoing open splenectomy for large splenic mass on 2/7/2025.  He has been doing very well since surgery with no significant fever, chills, or hypotension.  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  He struggled with persistent early satiety for the first few weeks post-op, but reports it is gradually improving and he is able to eat more with each meal.    Pathology:   1. Spleen, splenectomy: Hemangioma.     PHYSICAL EXAM:  Lungs: Clear  Heart: RRR  ABD: Midline incision is healing well without erythema or fluctuance  Ext: no significant edema, calves nontender  BMI is within normal parameters. No other follow-up for BMI required.    A/P:  This is a 54 y.o. male patient who is S/P open splenectomy on 2/7/2025    He has healed very well.  Now that he is almost 7 weeks postop, he can gradually reintroduce heavy lifting back into his daily activities but I encouraged him to ask for help with significant heavy lifting over the next 2 years to minimize his risk of incisional hernia development.  He can see me back on an as-needed basis, and I encouraged him to get the Streptococcus pneumoniae vaccine every 5 years to ensure immunity.    Radha Paredes MD  General, Robotic, and Endoscopic Surgery  [facility]    [address], [address]  [place] [address]  P: [phone]  F: [phone]

## 2025-04-10 ENCOUNTER — OFFICE VISIT (OUTPATIENT)
Dept: CARDIAC SURGERY | Facility: CLINIC | Age: 55
End: 2025-04-10
Payer: COMMERCIAL

## 2025-04-10 VITALS
SYSTOLIC BLOOD PRESSURE: 138 MMHG | HEIGHT: 72 IN | WEIGHT: 155 LBS | DIASTOLIC BLOOD PRESSURE: 89 MMHG | BODY MASS INDEX: 20.99 KG/M2 | HEART RATE: 58 BPM | RESPIRATION RATE: 18 BRPM | OXYGEN SATURATION: 100 %

## 2025-04-10 DIAGNOSIS — I71.21 ANEURYSM OF ASCENDING AORTA WITHOUT RUPTURE: Primary | ICD-10-CM

## 2025-04-10 DIAGNOSIS — I10 PRIMARY HYPERTENSION: ICD-10-CM

## 2025-04-14 ENCOUNTER — TELEPHONE (OUTPATIENT)
Dept: CARDIAC SURGERY | Facility: CLINIC | Age: 55
End: 2025-04-14
Payer: COMMERCIAL

## 2025-04-14 DIAGNOSIS — I71.21 ANEURYSM OF ASCENDING AORTA WITHOUT RUPTURE: Primary | ICD-10-CM

## 2025-04-14 NOTE — TELEPHONE ENCOUNTER
Spoke with pt advised that Dr. Richey had reviewed the PET scan from January and that the aneurysm measures 4.5cm and that Dr. Richey recommends repeat CTA Chest and ECHO in 6 months. Also scheduled appointment for 6 months with Dr. Richey.

## 2025-04-14 NOTE — PROGRESS NOTES
4/14/2025      Subjective:      Michael Izquierdo MD    Chief Complaint: Incidental finding of ascending aortic aneurysm    History of Present Illness:       Dear Michael Hernandez MD and Colleagues,    It was nice to see Memo Chang in consultation at your request. He is a 54 y.o. male who had a CT of the chest for calcium score and an incidental finding of ascending aortic dilatation.  PET/CT on 1/24/2025 was reviewed myself and the ascending aorta measures maximally at 4.5 cm.  He denies shortness of breath, chest/back pain, numbness/tingling/pain of extremities.  No vascular deficiencies or hyperextensible or hypermobile joints are noted on exam.  The patient's family history is negative for aneurysms or dissections, negative for connective tissue disease, and positive for coronary disease in first degree family members.      Social history:      Patient Active Problem List   Diagnosis    Gastroesophageal reflux disease with esophagitis    Other male erectile dysfunction    Congenital hemangioma, right face    Abnormal fasting glucose    Pure hypercholesterolemia    Benign cyst of skin    Other specified soft tissue disorders-right forearm, volar aspect    Aura    Aneurysm of ascending aorta without rupture    H/O splenectomy    Splenic mass       Past Medical History:   Diagnosis Date    Aorta aneurysm     STATES SL: PICKED UP ON CT SCAN    Migraine     Skin abnormality     RIGHT INER FOREARM:  HAD CYST LIKE AREA REMOVED 2-3-25 AT DR. CLEMONS'S OFFICE:  AREA HEALING    Spleen disease     SPLEEN MASS:  PRESSURE       Past Surgical History:   Procedure Laterality Date    SKIN LESION EXCISION Right 05/03/2023    rt cheek    SPLENECTOMY N/A 2/7/2025    Procedure: Open splenectomy;  Surgeon: Radha Paredes MD;  Location: [facility];  Service: General;  Laterality: N/A;    WISDOM TOOTH EXTRACTION         No Known Allergies      Current Outpatient Medications:     MAGNESIUM PO, Take 1 tablet by mouth 1  (One) Time Per Week., Disp: , Rfl:     vitamin B-12 (CYANOCOBALAMIN) 100 MCG tablet, Take 0.5 tablets by mouth 1 (One) Time Per Week., Disp: , Rfl:     Social History     Socioeconomic History    Marital status:    Tobacco Use    Smoking status: Never    Smokeless tobacco: Never   Vaping Use    Vaping status: Never Used   Substance and Sexual Activity    Alcohol use: No    Drug use: Never    Sexual activity: Defer       Family History   Problem Relation Age of Onset    Colon cancer Maternal Grandfather     Cancer Maternal Grandfather     Colon cancer Paternal Grandfather     Cancer Paternal Grandfather     Anxiety disorder Mother     Malig Hyperthermia Neg Hx        The following portions of the patient's history were reviewed and updated as appropriate: He  has a past medical history of Aorta aneurysm, Migraine, Skin abnormality, and Spleen disease.  He does not have any pertinent problems on file.  He  has a past surgical history that includes Skin lesion excision (Right, 05/03/2023); [place] tooth extraction; and Splenectomy, total (N/A, 2/7/2025).  His family history includes Anxiety disorder in his mother; Cancer in his maternal grandfather and paternal grandfather; Colon cancer in his maternal grandfather and paternal grandfather.  He  reports that he has never smoked. He has never used smokeless tobacco. He reports that he does not drink alcohol and does not use drugs.  Current Outpatient Medications   Medication Sig Dispense Refill    MAGNESIUM PO Take 1 tablet by mouth 1 (One) Time Per Week.      vitamin B-12 (CYANOCOBALAMIN) 100 MCG tablet Take 0.5 tablets by mouth 1 (One) Time Per Week.       No current facility-administered medications for this visit.     Current Outpatient Medications on File Prior to Visit   Medication Sig    MAGNESIUM PO Take 1 tablet by mouth 1 (One) Time Per Week.    vitamin B-12 (CYANOCOBALAMIN) 100 MCG tablet Take 0.5 tablets by mouth 1 (One) Time Per Week.     No current  facility-administered medications on file prior to visit.     He has no known allergies..    Review of Systems:  Review of Systems    Physical Exam:    Vital Signs:  Weight: 70.3 kg (155 lb)   Body mass index is 21.02 kg/m².  Temp: (not recorded)   Heart Rate: 58   BP: 138/89     Physical Exam     Assessment:     -Incidental finding ascending aortic aneurysm.  4.5 cm.  Follow-up in 6 months with a CTA and an echocardiogram.    Recommendation/Plan:     We discussed the importance of avoidance of over the counter decongestants and stimulants, specifically pseudoephedrine, for hypertension and aneurysm management    Risk factor modification of hypertension with a goal blood pressure less than 130/80, hyperlipidemia optimization, and continued avoidance of tobacco/nicotine products.    Initiation of low aerobic exercise is indicated to help reduce body habitus, assist with blood pressure and cholesterol control.       Although risk of rupture is low, emergency department presentation is warranted for acute chest, back, or abdominal pain, syncope, or stroke like symptoms    Thank you for allowing us to participate in his care.    Regards,    Romulo Richey MD    ** all problems new to this practitioner, extensive review of records prior and during patient interview, >45 minutes in face to face conversation regarding treatment plan, education, and answering any questions the patient may have had

## 2025-07-17 NOTE — PATIENT INSTRUCTIONS
"Fat and Cholesterol Restricted Diet  Getting too much fat and cholesterol in your diet may cause health problems. Following this diet helps keep your fat and cholesterol at normal levels. This can keep you from getting sick.  WHAT TYPES OF FAT SHOULD I CHOOSE?  Choose monosaturated and polyunsaturated fats. These are found in foods such as olive oil, canola oil, flaxseeds, walnuts, almonds, and seeds.  Eat more omega-3 fats. Good choices include salmon, mackerel, sardines, tuna, flaxseed oil, and ground flaxseeds.  Limit saturated fats. These are in animal products such as meats, butter, and cream. They can also be in plant products such as palm oil, palm kernel oil, and coconut oil.      Avoid foods with partially hydrogenated oils in them. These contain trans fats. Examples of foods that have trans fats are stick margarine, some tub margarines, cookies, crackers, and other baked goods.  WHAT GENERAL GUIDELINES DO I NEED TO FOLLOW?    Check food labels. Look for the words \"trans fat\" and \"saturated fat.\"  When preparing a meal:  Fill half of your plate with vegetables and green salads.  Fill one fourth of your plate with whole grains. Look for the word \"whole\" as the first word in the ingredient list.  Fill one fourth of your plate with lean protein foods.  Limit fruit to two servings a day. Choose fruit instead of juice.  Eat more foods with soluble fiber. Examples of foods with this type of fiber are apples, broccoli, carrots, beans, peas, and barley. Try to get 20-30 g (grams) of fiber per day.  Eat more home-cooked foods. Eat less at restaurants and buffets.  Limit or avoid alcohol.  Limit foods high in starch and sugar.Fat and Cholesterol Restricted Eating Plan  Getting too much fat and cholesterol in your diet may cause health problems. Choosing the right foods helps keep your fat and cholesterol at normal levels. This can keep you from getting certain diseases.  Your doctor may recommend an eating plan that " "includes:  Total fat: ______% or less of total calories a day.  Saturated fat: ______% or less of total calories a day.  Cholesterol: less than _________mg a day.  Fiber: ______g a day.  What are tips for following this plan?  Meal planning  At meals, divide your plate into four equal parts:  Fill one-half of your plate with vegetables and green salads.  Fill one-fourth of your plate with whole grains.  Fill one-fourth of your plate with low-fat (lean) protein foods.  Eat fish that is high in omega-3 fats at least two times a week. This includes mackerel, tuna, sardines, and salmon.  Eat foods that are high in fiber, such as whole grains, beans, apples, broccoli, carrots, peas, and barley.  General tips    Work with your doctor to lose weight if you need to.  Avoid:  Foods with added sugar.  Fried foods.  Foods with partially hydrogenated oils.  Limit alcohol intake to no more than 1 drink a day for nonpregnant women and 2 drinks a day for men. One drink equals 12 oz of beer, 5 oz of wine, or 1½ oz of hard liquor.    Reading food labels  Check food labels for:  Trans fats.  Partially hydrogenated oils.  Saturated fat (g) in each serving.  Cholesterol (mg) in each serving.  Fiber (g) in each serving.  Choose foods with healthy fats, such as:  Monounsaturated fats.  Polyunsaturated fats.  Omega-3 fats.  Choose grain products that have whole grains. Look for the word \"whole\" as the first word in the ingredient list.  Cooking  Cook foods using low-fat methods. These include baking, boiling, grilling, and broiling.  Eat more home-cooked foods. Eat at restaurants and buffets less often.  Avoid cooking using saturated fats, such as butter, cream, palm oil, palm kernel oil, and coconut oil.  Recommended foods    Fruits  All fresh, canned (in natural juice), or frozen fruits.  Vegetables  Fresh or frozen vegetables (raw, steamed, roasted, or grilled). Green salads.  Grains  Whole grains, such as whole wheat or whole grain " breads, crackers, cereals, and pasta. Unsweetened oatmeal, bulgur, barley, quinoa, or brown rice. Corn or whole wheat flour tortillas.  Meats and other protein foods  Ground beef (85% or leaner), grass-fed beef, or beef trimmed of fat. Skinless chicken or turkey. Ground chicken or turkey. Pork trimmed of fat. All fish and seafood. Egg whites. Dried beans, peas, or lentils. Unsalted nuts or seeds. Unsalted canned beans. Nut butters without added sugar or oil.  Dairy  Low-fat or nonfat dairy products, such as skim or 1% milk, 2% or reduced-fat cheeses, low-fat and fat-free ricotta or cottage cheese, or plain low-fat and nonfat yogurt.  Fats and oils  Tub margarine without trans fats. Light or reduced-fat mayonnaise and salad dressings. Avocado. Olive, canola, sesame, or safflower oils.  The items listed above may not be a complete list of foods and beverages you can eat. Contact a dietitian for more information.  Foods to avoid  Fruits  Canned fruit in heavy syrup. Fruit in cream or butter sauce. Fried fruit.  Vegetables  Vegetables cooked in cheese, cream, or butter sauce. Fried vegetables.  Grains  White bread. White pasta. White rice. Cornbread. Bagels, pastries, and croissants. Crackers and snack foods that contain trans fat and hydrogenated oils.  Meats and other protein foods  Fatty cuts of meat. Ribs, chicken wings, kaur, sausage, bologna, salami, chitterlings, fatback, hot dogs, bratwurst, and packaged lunch meats. Liver and organ meats. Whole eggs and egg yolks. Chicken and turkey with skin. Fried meat.  Dairy  Whole or 2% milk, cream, half-and-half, and cream cheese. Whole milk cheeses. Whole-fat or sweetened yogurt. Full-fat cheeses. Nondairy creamers and whipped toppings. Processed cheese, cheese spreads, and cheese curds.  Beverages  Alcohol. Sugar-sweetened drinks such as sodas, lemonade, and fruit drinks.  Fats and oils  Butter, stick margarine, lard, shortening, ghee, or kaur fat. Coconut, palm  kernel, and palm oils.  Sweets and desserts  Corn syrup, sugars, honey, and molasses. Candy. Jam and jelly. Syrup. Sweetened cereals. Cookies, pies, cakes, donuts, muffins, and ice cream.  The items listed above may not be a complete list of foods and beverages you should avoid. Contact a dietitian for more information.  Summary  Choosing the right foods helps keep your fat and cholesterol at normal levels. This can keep you from getting certain diseases.  At meals, fill one-half of your plate with vegetables and green salads.  Eat high-fiber foods, like whole grains, beans, apples, carrots, peas, and barley.  Limit added sugar, saturated fats, alcohol, and fried foods.  This information is not intended to replace advice given to you by your health care provider. Make sure you discuss any questions you have with your health care provider.  Document Revised: 04/21/2021 Document Reviewed: 04/21/2021  [employer] Patient Education © 2021 [employer]    Limit fried foods.  Cook foods without frying them. Baking, boiling, grilling, and broiling are all great options.  Lose weight if you are overweight. Losing even a small amount of weight can help your overall health. It can also help prevent diseases such as diabetes and heart disease.  WHAT FOODS CAN I EAT?  Grains  Whole grains, such as whole wheat or whole grain breads, crackers, cereals, and pasta. Unsweetened oatmeal, bulgur, barley, quinoa, or brown rice. Corn or whole wheat flour tortillas.  Vegetables  Fresh or frozen vegetables (raw, steamed, roasted, or grilled). Green salads.  Fruits  All fresh, canned (in natural juice), or frozen fruits.  Meat and Other Protein Products  Ground beef (85% or leaner), grass-fed beef, or beef trimmed of fat. Skinless chicken or turkey. Ground chicken or turkey. Pork trimmed of fat. All fish and seafood. Eggs. Dried beans, peas, or lentils. Unsalted nuts or seeds. Unsalted canned or dry beans.  Dairy  Low-fat dairy products,  such as skim or 1% milk, 2% or reduced-fat cheeses, low-fat ricotta or cottage cheese, or plain low-fat yogurt.  Fats and Oils  Tub margarines without trans fats. Light or reduced-fat mayonnaise and salad dressings. Avocado. Olive, canola, sesame, or safflower oils. Natural peanut or almond butter (choose ones without added sugar and oil).  The items listed above may not be a complete list of recommended foods or beverages. Contact your dietitian for more options.  WHAT FOODS ARE NOT RECOMMENDED?  Grains  White bread. White pasta. White rice. Cornbread. Bagels, pastries, and croissants. Crackers that contain trans fat.  Vegetables  White potatoes. Corn. Creamed or fried vegetables. Vegetables in a cheese sauce.  Fruits  Dried fruits. Canned fruit in light or heavy syrup. Fruit juice.  Meat and Other Protein Products  Fatty cuts of meat. Ribs, chicken wings, kaur, sausage, bologna, salami, chitterlings, fatback, hot dogs, bratwurst, and packaged luncheon meats. Liver and organ meats.  Dairy  Whole or 2% milk, cream, half-and-half, and cream cheese. Whole milk cheeses. Whole-fat or sweetened yogurt. Full-fat cheeses. Nondairy creamers and whipped toppings. Processed cheese, cheese spreads, or cheese curds.  Sweets and Desserts  Corn syrup, sugars, honey, and molasses. Candy. Jam and jelly. Syrup. Sweetened cereals. Cookies, pies, cakes, donuts, muffins, and ice cream.  Fats and Oils  Butter, stick margarine, lard, shortening, ghee, or kaur fat. Coconut, palm kernel, or palm oils.  Beverages  Alcohol. Sweetened drinks (such as sodas, lemonade, and fruit drinks or punches).  The items listed above may not be a complete list of foods and beverages to avoid. Contact your dietitian for more information.  Document Released: 06/18/2013 Document Revised: 08/21/2015 Document Reviewed: 03/19/2015  [employer]® Patient Information ©2015 [employer], [facility]. This information is not intended to replace advice given to you by your health  care provider. Make sure you discuss any questions you have with your health care provider. Cholesterol Content in Foods  Cholesterol is a waxy, fat-like substance that helps to carry fat in the blood. The body needs cholesterol in small amounts, but too much cholesterol can cause damage to the arteries and heart.  What foods have cholesterol?    Cholesterol is found in animal-based foods, such as meat, seafood, and dairy. Generally, low-fat dairy and lean meats have less cholesterol than full-fat dairy and fatty meats. The milligrams of cholesterol per serving (mg per serving) of common cholesterol-containing foods are listed below.  Meats and other proteins  Egg -- one large whole egg has 186 mg.  Veal shank -- 4 oz (113 g) has 141 mg.  Lean ground turkey (93% lean) -- 4 oz (113 g) has 118 mg.  Fat-trimmed lamb loin -- 4 oz (113 g) has 106 mg.  Lean ground beef (90% lean) -- 4 oz (113 g) has 100 mg.  Lobster -- 3.5 oz (99 g) has 90 mg.  Pork loin chops -- 4 oz (113 g) has 86 mg.  Canned salmon -- 3.5 oz (99 g) has 83 mg.  Fat-trimmed beef top loin -- 4 oz (113 g) has 78 mg.  Frankfurter -- 1 anuradha (3.5 oz or 99 g) has 77 mg.  Crab -- 3.5 oz (99 g) has 71 mg.  Roasted chicken without skin, white meat -- 4 oz (113 g) has 66 mg.  Light bologna -- 2 oz (57 g) has 45 mg.  Deli-cut turkey -- 2 oz (57 g) has 31 mg.  Canned tuna -- 3.5 oz (99 g) has 31 mg.  Fofana -- 1 oz (28 g) has 29 mg.  Oysters and mussels (raw) -- 3.5 oz (99 g) has 25 mg.  Mackerel -- 1 oz (28 g) has 22 mg.  Trout -- 1 oz (28 g) has 20 mg.  Pork sausage -- 1 link (1 oz or 28 g) has 17 mg.  [place] -- 1 oz (28 g) has 16 mg.  Tilapia -- 1 oz (28 g) has 14 mg.  Dairy  Soft-serve ice cream -- ½ cup (4 oz or 86 g) has 103 mg.  Whole-milk yogurt -- 1 cup (8 oz or 245 g) has 29 mg.  Cheddar cheese -- 1 oz (28 g) has 28 mg.  American cheese -- 1 oz (28 g) has 28 mg.  Whole milk -- 1 cup (8 oz or 250 mL) has 23 mg.  2% milk -- 1 cup (8 oz or 250 mL) has 18  mg.  Cream cheese -- 1 tablespoon (Tbsp) (14.5 g) has 15 mg.  Cottage cheese -- ½ cup (4 oz or 113 g) has 14 mg.  Low-fat (1%) milk -- 1 cup (8 oz or 250 mL) has 10 mg.  Sour cream -- 1 Tbsp (12 g) has 8.5 mg.  Low-fat yogurt -- 1 cup (8 oz or 245 g) has 8 mg.  Nonfat Greek yogurt -- 1 cup (8 oz or 228 g) has 7 mg.  Half-and-half cream -- 1 Tbsp (15 mL) has 5 mg.  Fats and oils  Cod liver oil -- 1 tablespoon (Tbsp) (13.6 g) has 82 mg.  Butter -- 1 Tbsp (14 g) has 15 mg.  Lard -- 1 Tbsp (12.8 g) has 14 mg.  Fofana grease -- 1 Tbsp (12.9 g) has 14 mg.  Mayonnaise -- 1 Tbsp (13.8 g) has 5-10 mg.  Margarine -- 1 Tbsp (14 g) has 3-10 mg.  The items listed above may not be a complete list of foods with cholesterol. Exact amounts of cholesterol in these foods may vary depending on specific ingredients and brands. Contact a dietitian for more information.  What foods do not have cholesterol?  Most plant-based foods do not have cholesterol unless you combine them with a food that has cholesterol. Foods without cholesterol include:  Grains and cereals.  Vegetables.  Fruits.  Vegetable oils, such as olive, canola, and sunflower oil.  Legumes, such as peas, beans, and lentils.  Nuts and seeds.  Egg whites.  The items listed above may not be a complete list of foods that do not have cholesterol. Contact a dietitian for more information.  Summary  The body needs cholesterol in small amounts, but too much cholesterol can cause damage to the arteries and heart.  Cholesterol is found in animal-based foods, such as meat, seafood, and dairy. Generally, low-fat dairy and lean meats have less cholesterol than full-fat dairy and fatty meats.  This information is not intended to replace advice given to you by your health care provider. Make sure you discuss any questions you have with your health care provider.  Document Revised: 04/29/2022 Document Reviewed: 04/29/2022  [employer] Patient Education © 2024 [employer]     none

## 2025-08-08 ENCOUNTER — HOSPITAL ENCOUNTER (OUTPATIENT)
Dept: CARDIOLOGY | Facility: HOSPITAL | Age: 55
Discharge: HOME | End: 2025-08-08
Payer: COMMERCIAL

## 2025-08-08 ENCOUNTER — HOSPITAL ENCOUNTER (OUTPATIENT)
Dept: CT IMAGING | Facility: HOSPITAL | Age: 55
Discharge: HOME | End: 2025-08-08
Payer: COMMERCIAL

## 2025-08-08 VITALS
BODY MASS INDEX: 20.99 KG/M2 | SYSTOLIC BLOOD PRESSURE: 151 MMHG | HEIGHT: 72 IN | DIASTOLIC BLOOD PRESSURE: 85 MMHG | WEIGHT: 155 LBS | HEART RATE: 64 BPM

## 2025-08-08 DIAGNOSIS — I71.21 ANEURYSM OF ASCENDING AORTA WITHOUT RUPTURE: ICD-10-CM

## 2025-08-08 LAB
AORTIC ARCH: 2.7 CM
AORTIC DIMENSIONLESS INDEX: 0.63 (DI)
ASCENDING AORTA: 4.1 CM
AV MEAN PRESS GRAD SYS DOP V1V2: 4.7 MMHG
AV VMAX SYS DOP: 158.8 CM/SEC
BH CV ECHO MEAS - ACS: 0.81 CM
BH CV ECHO MEAS - AO MAX PG: 10.1 MMHG
BH CV ECHO MEAS - AO ROOT DIAM: 3.7 CM
BH CV ECHO MEAS - AO V2 VTI: 32.1 CM
BH CV ECHO MEAS - AVA(I,D): 2.5 CM2
BH CV ECHO MEAS - EDV(CUBED): 110.5 ML
BH CV ECHO MEAS - EDV(MOD-SP2): 111 ML
BH CV ECHO MEAS - EDV(MOD-SP4): 134 ML
BH CV ECHO MEAS - EF(MOD-SP2): 58.6 %
BH CV ECHO MEAS - EF(MOD-SP4): 59.7 %
BH CV ECHO MEAS - ESV(CUBED): 35.1 ML
BH CV ECHO MEAS - ESV(MOD-SP2): 46 ML
BH CV ECHO MEAS - ESV(MOD-SP4): 54 ML
BH CV ECHO MEAS - FS: 31.8 %
BH CV ECHO MEAS - IVS/LVPW: 1.12 CM
BH CV ECHO MEAS - IVSD: 1.16 CM
BH CV ECHO MEAS - LA DIMENSION: 2.9 CM
BH CV ECHO MEAS - LAT PEAK E' VEL: 12.6 CM/SEC
BH CV ECHO MEAS - LV DIASTOLIC VOL/BSA (35-75): 70.1 CM2
BH CV ECHO MEAS - LV MASS(C)D: 194.3 GRAMS
BH CV ECHO MEAS - LV MAX PG: 3.2 MMHG
BH CV ECHO MEAS - LV MEAN PG: 1.76 MMHG
BH CV ECHO MEAS - LV SYSTOLIC VOL/BSA (12-30): 28.2 CM2
BH CV ECHO MEAS - LV V1 MAX: 89.1 CM/SEC
BH CV ECHO MEAS - LV V1 VTI: 20.2 CM
BH CV ECHO MEAS - LVIDD: 4.8 CM
BH CV ECHO MEAS - LVIDS: 3.3 CM
BH CV ECHO MEAS - LVOT AREA: 4 CM2
BH CV ECHO MEAS - LVOT DIAM: 2.26 CM
BH CV ECHO MEAS - LVPWD: 1.04 CM
BH CV ECHO MEAS - MED PEAK E' VEL: 9.4 CM/SEC
BH CV ECHO MEAS - MV A DUR: 0.12 SEC
BH CV ECHO MEAS - MV A MAX VEL: 45.8 CM/SEC
BH CV ECHO MEAS - MV DEC SLOPE: 385.1 CM/SEC2
BH CV ECHO MEAS - MV DEC TIME: 0.2 SEC
BH CV ECHO MEAS - MV E MAX VEL: 76.3 CM/SEC
BH CV ECHO MEAS - MV E/A: 1.66
BH CV ECHO MEAS - MV MAX PG: 2.6 MMHG
BH CV ECHO MEAS - MV MEAN PG: 1.19 MMHG
BH CV ECHO MEAS - MV P1/2T: 66.1 MSEC
BH CV ECHO MEAS - MV V2 VTI: 29.9 CM
BH CV ECHO MEAS - MVA(P1/2T): 3.3 CM2
BH CV ECHO MEAS - MVA(VTI): 2.7 CM2
BH CV ECHO MEAS - PA ACC TIME: 0.19 SEC
BH CV ECHO MEAS - PA V2 MAX: 93.7 CM/SEC
BH CV ECHO MEAS - PULM A REVS DUR: 0.15 SEC
BH CV ECHO MEAS - PULM A REVS VEL: 24.5 CM/SEC
BH CV ECHO MEAS - PULM DIAS VEL: 32.1 CM/SEC
BH CV ECHO MEAS - PULM S/D: 0.84
BH CV ECHO MEAS - PULM SYS VEL: 26.9 CM/SEC
BH CV ECHO MEAS - QP/QS: 0.58
BH CV ECHO MEAS - RV MAX PG: 1.15 MMHG
BH CV ECHO MEAS - RV V1 MAX: 53.6 CM/SEC
BH CV ECHO MEAS - RV V1 VTI: 10.8 CM
BH CV ECHO MEAS - RVDD: 1.46 CM
BH CV ECHO MEAS - RVOT DIAM: 2.36 CM
BH CV ECHO MEAS - SUP REN AO DIAM: 2.1 CM
BH CV ECHO MEAS - SV(LVOT): 81.2 ML
BH CV ECHO MEAS - SV(MOD-SP2): 65 ML
BH CV ECHO MEAS - SV(MOD-SP4): 80 ML
BH CV ECHO MEAS - SV(RVOT): 47.2 ML
BH CV ECHO MEAS - SVI(LVOT): 42.5 ML/M2
BH CV ECHO MEAS - SVI(MOD-SP2): 34 ML/M2
BH CV ECHO MEAS - SVI(MOD-SP4): 41.8 ML/M2
BH CV ECHO MEAS - TAPSE (>1.6): 2.8 CM
BH CV ECHO MEASUREMENTS AVERAGE E/E' RATIO: 6.94
BH CV XLRA - RV BASE: 3.7 CM
BH CV XLRA - RV LENGTH: 5.8 CM
BH CV XLRA - RV MID: 2.8 CM
BH CV XLRA - TDI S': 14.7 CM/SEC
LEFT ATRIUM VOLUME INDEX: 15 ML/M2
LV EF BIPLANE MOD: 60.9 %
SINUS: 3.5 CM
STJ: 3.2 CM

## 2025-08-08 PROCEDURE — 25510000001 IOPAMIDOL PER 1 ML

## 2025-08-08 PROCEDURE — 71275 CT ANGIOGRAPHY CHEST: CPT

## 2025-08-08 PROCEDURE — 93306 TTE W/DOPPLER COMPLETE: CPT

## 2025-08-08 PROCEDURE — 25510000001 PERFLUTREN 6.52 MG/ML SUSPENSION 2 ML VIAL

## 2025-08-08 RX ORDER — IOPAMIDOL 755 MG/ML
100 INJECTION, SOLUTION INTRAVASCULAR
Status: COMPLETED | OUTPATIENT
Start: 2025-08-08 | End: 2025-08-08

## 2025-08-08 RX ADMIN — IOPAMIDOL 95 ML: 755 INJECTION, SOLUTION INTRAVENOUS at 14:23

## 2025-08-08 RX ADMIN — PERFLUTREN 2 ML: 6.52 INJECTION, SUSPENSION INTRAVENOUS at 14:14

## (undated) DEVICE — ELECTRD BLD EZ CLN MOD XLNG 2.75IN

## (undated) DEVICE — ELECTRD BLD EZ CLN MOD 6.5IN

## (undated) DEVICE — PENCL SMOKE/EVAC MEGADYNE TELESCP 10FT

## (undated) DEVICE — Device

## (undated) DEVICE — GLV SURG SENSICARE POLYISPRN W/ALOE PF LF 6.5 GRN STRL

## (undated) DEVICE — SYR LL TP 10ML STRL

## (undated) DEVICE — ECHELON FLEX 60 ARTICULATING ENDOSCOPIC LINEAR CUTTER (NO CARTRIDGE): Brand: ECHELON FLEX ENDOPATH

## (undated) DEVICE — 1LYRTR 16FR10ML100%SIL UMS SNP: Brand: MEDLINE INDUSTRIES, INC.

## (undated) DEVICE — APPL CHLORAPREP HI/LITE 26ML ORNG

## (undated) DEVICE — GLV SURG BIOGEL LTX PF 6

## (undated) DEVICE — SUT PDS 0 CT1 36IN Z346H

## (undated) DEVICE — SUT SILK 2/0 TIES 18IN A185H

## (undated) DEVICE — PK PROC MAJ 40

## (undated) DEVICE — DRSNG WND BORDR/ADHS NONADHR/GZ LF 4X14IN STRL

## (undated) DEVICE — STPLR SKIN VISISTAT WD 35CT

## (undated) DEVICE — SUT SILK 3/0 TIES 18IN A184H

## (undated) DEVICE — SUT SILK 2/0 SH CR8 18IN CR8 C012D

## (undated) DEVICE — GOWN,SIRUS,NON REINFRCD,LARGE,SET IN SL: Brand: MEDLINE